# Patient Record
Sex: FEMALE | Race: BLACK OR AFRICAN AMERICAN | NOT HISPANIC OR LATINO | Employment: UNEMPLOYED | URBAN - METROPOLITAN AREA
[De-identification: names, ages, dates, MRNs, and addresses within clinical notes are randomized per-mention and may not be internally consistent; named-entity substitution may affect disease eponyms.]

---

## 2023-02-19 PROBLEM — R29.898 WEAKNESS OF LEFT LOWER EXTREMITY: Status: ACTIVE | Noted: 2023-02-19

## 2023-02-19 PROBLEM — M25.551 PAIN OF RIGHT HIP JOINT: Status: ACTIVE | Noted: 2023-02-19

## 2023-02-19 PROBLEM — M54.9 BACK PAIN: Status: ACTIVE | Noted: 2023-02-19

## 2023-02-19 PROBLEM — E04.9 THYROID ENLARGEMENT: Status: ACTIVE | Noted: 2023-02-19

## 2023-02-19 PROBLEM — I10 ESSENTIAL HYPERTENSION: Status: ACTIVE | Noted: 2023-02-19

## 2023-02-19 PROBLEM — R26.9 GAIT ABNORMALITY: Status: ACTIVE | Noted: 2023-02-19

## 2023-02-19 PROBLEM — E11.9 DIABETES MELLITUS (MULTI): Status: ACTIVE | Noted: 2023-02-19

## 2023-02-19 PROBLEM — R79.89 ABNORMAL LFTS: Status: ACTIVE | Noted: 2023-02-19

## 2023-02-19 PROBLEM — E87.6 HYPOKALEMIA: Status: ACTIVE | Noted: 2023-02-19

## 2023-02-19 PROBLEM — R06.09 DYSPNEA ON EXERTION: Status: ACTIVE | Noted: 2023-02-19

## 2023-02-19 PROBLEM — D17.1 LIPOMA OF TORSO: Status: ACTIVE | Noted: 2023-02-19

## 2023-02-19 PROBLEM — R00.0 TACHYCARDIA: Status: ACTIVE | Noted: 2023-02-19

## 2023-02-19 PROBLEM — E78.5 HYPERLIPIDEMIA: Status: ACTIVE | Noted: 2023-02-19

## 2023-02-19 PROBLEM — S82.262A: Status: ACTIVE | Noted: 2023-02-19

## 2023-02-19 PROBLEM — M19.90 ARTHRITIS: Status: ACTIVE | Noted: 2023-02-19

## 2023-02-19 PROBLEM — R79.89 LOW VITAMIN D LEVEL: Status: ACTIVE | Noted: 2023-02-19

## 2023-02-19 PROBLEM — R63.4 WEIGHT LOSS: Status: ACTIVE | Noted: 2023-02-19

## 2023-02-19 PROBLEM — K21.9 ACID REFLUX: Status: ACTIVE | Noted: 2023-02-19

## 2023-02-19 PROBLEM — D64.9 ANEMIA: Status: ACTIVE | Noted: 2023-02-19

## 2023-02-19 PROBLEM — F41.9 ANXIETY: Status: ACTIVE | Noted: 2023-02-19

## 2023-02-19 PROBLEM — G89.29 CHRONIC PAIN: Status: ACTIVE | Noted: 2023-02-19

## 2023-02-19 RX ORDER — METFORMIN HYDROCHLORIDE 1000 MG/1
1 TABLET ORAL
COMMUNITY
End: 2023-06-27 | Stop reason: SDUPTHER

## 2023-02-19 RX ORDER — ATORVASTATIN CALCIUM 40 MG/1
1 TABLET, FILM COATED ORAL NIGHTLY
COMMUNITY
End: 2023-05-25

## 2023-02-19 RX ORDER — PAROXETINE HYDROCHLORIDE 40 MG/1
20 TABLET, FILM COATED ORAL DAILY
COMMUNITY
End: 2023-05-25

## 2023-02-19 RX ORDER — CALCIUM CITRATE/VITAMIN D3 200MG-6.25
1 TABLET ORAL 3 TIMES DAILY
COMMUNITY
Start: 2020-06-02

## 2023-02-19 RX ORDER — DULOXETIN HYDROCHLORIDE 60 MG/1
60 CAPSULE, DELAYED RELEASE ORAL DAILY
COMMUNITY

## 2023-02-19 RX ORDER — OMEGA-3-ACID ETHYL ESTERS 1 G/1
2 CAPSULE, LIQUID FILLED ORAL 2 TIMES DAILY
COMMUNITY
Start: 2022-07-01 | End: 2023-12-13 | Stop reason: SDUPTHER

## 2023-02-19 RX ORDER — OXYCODONE HYDROCHLORIDE 10 MG/1
TABLET ORAL
COMMUNITY
Start: 2021-05-11

## 2023-02-19 RX ORDER — GLIPIZIDE 5 MG/1
1 TABLET ORAL
COMMUNITY
Start: 2022-07-21 | End: 2023-08-23 | Stop reason: SDUPTHER

## 2023-02-19 RX ORDER — OMEPRAZOLE 20 MG/1
1 CAPSULE, DELAYED RELEASE ORAL DAILY
COMMUNITY
Start: 2022-06-14 | End: 2023-05-25

## 2023-02-19 RX ORDER — BLOOD-GLUCOSE CONTROL, NORMAL
EACH MISCELLANEOUS
COMMUNITY

## 2023-02-19 RX ORDER — LIDOCAINE 3.5 G/1
1 PATCH TOPICAL DAILY
COMMUNITY
Start: 2022-04-27

## 2023-02-19 RX ORDER — BLOOD-GLUCOSE CONTROL, NORMAL
EACH MISCELLANEOUS 3 TIMES DAILY
COMMUNITY
End: 2024-05-17 | Stop reason: ALTCHOICE

## 2023-02-19 RX ORDER — BLOOD-GLUCOSE METER
EACH MISCELLANEOUS 3 TIMES DAILY
COMMUNITY

## 2023-02-19 RX ORDER — DIFLORASONE DIACETATE 0.5 MG/G
1 CREAM TOPICAL
COMMUNITY
Start: 2022-06-16 | End: 2023-12-13 | Stop reason: SDUPTHER

## 2023-02-19 RX ORDER — KETOCONAZOLE 20 MG/ML
1 SHAMPOO, SUSPENSION TOPICAL EVERY OTHER DAY
COMMUNITY
Start: 2022-04-29 | End: 2023-11-17 | Stop reason: SDUPTHER

## 2023-03-13 ENCOUNTER — OFFICE VISIT (OUTPATIENT)
Dept: PRIMARY CARE | Facility: CLINIC | Age: 63
End: 2023-03-13
Payer: COMMERCIAL

## 2023-03-13 VITALS
HEART RATE: 96 BPM | RESPIRATION RATE: 16 BRPM | HEIGHT: 69 IN | BODY MASS INDEX: 34.36 KG/M2 | SYSTOLIC BLOOD PRESSURE: 126 MMHG | OXYGEN SATURATION: 98 % | DIASTOLIC BLOOD PRESSURE: 82 MMHG | WEIGHT: 232 LBS

## 2023-03-13 DIAGNOSIS — R79.89 LOW VITAMIN D LEVEL: ICD-10-CM

## 2023-03-13 DIAGNOSIS — N39.498 OTHER URINARY INCONTINENCE: ICD-10-CM

## 2023-03-13 DIAGNOSIS — R23.8 DRY SCALP: ICD-10-CM

## 2023-03-13 DIAGNOSIS — Z12.31 SCREENING MAMMOGRAM, ENCOUNTER FOR: ICD-10-CM

## 2023-03-13 DIAGNOSIS — E11.9 TYPE 2 DIABETES MELLITUS WITHOUT COMPLICATION, WITHOUT LONG-TERM CURRENT USE OF INSULIN (MULTI): Primary | ICD-10-CM

## 2023-03-13 DIAGNOSIS — R31.29 OTHER MICROSCOPIC HEMATURIA: ICD-10-CM

## 2023-03-13 DIAGNOSIS — Z12.11 ENCOUNTER FOR SCREENING FOR MALIGNANT NEOPLASM OF COLON: ICD-10-CM

## 2023-03-13 LAB
POC APPEARANCE, URINE: CLEAR
POC BILIRUBIN, URINE: ABNORMAL
POC BLOOD, URINE: ABNORMAL
POC COLOR, URINE: ABNORMAL
POC GLUCOSE, URINE: NEGATIVE MG/DL
POC KETONES, URINE: NEGATIVE MG/DL
POC LEUKOCYTES, URINE: NEGATIVE
POC NITRITE,URINE: NEGATIVE
POC PH, URINE: 7 PH
POC PROTEIN, URINE: NEGATIVE MG/DL
POC SPECIFIC GRAVITY, URINE: 1.01
POC UROBILINOGEN, URINE: 0.2 EU/DL

## 2023-03-13 PROCEDURE — 3074F SYST BP LT 130 MM HG: CPT | Performed by: STUDENT IN AN ORGANIZED HEALTH CARE EDUCATION/TRAINING PROGRAM

## 2023-03-13 PROCEDURE — 3079F DIAST BP 80-89 MM HG: CPT | Performed by: STUDENT IN AN ORGANIZED HEALTH CARE EDUCATION/TRAINING PROGRAM

## 2023-03-13 PROCEDURE — 99214 OFFICE O/P EST MOD 30 MIN: CPT | Performed by: STUDENT IN AN ORGANIZED HEALTH CARE EDUCATION/TRAINING PROGRAM

## 2023-03-13 PROCEDURE — 1036F TOBACCO NON-USER: CPT | Performed by: STUDENT IN AN ORGANIZED HEALTH CARE EDUCATION/TRAINING PROGRAM

## 2023-03-13 PROCEDURE — 81002 URINALYSIS NONAUTO W/O SCOPE: CPT | Performed by: STUDENT IN AN ORGANIZED HEALTH CARE EDUCATION/TRAINING PROGRAM

## 2023-03-13 RX ORDER — CHLORTHALIDONE 25 MG/1
1 TABLET ORAL DAILY
COMMUNITY

## 2023-03-13 RX ORDER — CYCLOBENZAPRINE HCL 10 MG
1 TABLET ORAL
COMMUNITY
Start: 2022-04-27

## 2023-03-13 RX ORDER — CALCIUM CARB/VITAMIN D3/VIT K1 500-500-40
TABLET,CHEWABLE ORAL
COMMUNITY
End: 2023-03-13 | Stop reason: SDUPTHER

## 2023-03-13 RX ORDER — LANCETS 30 GAUGE
EACH MISCELLANEOUS
COMMUNITY
Start: 2023-02-12

## 2023-03-13 RX ORDER — FLUTICASONE PROPIONATE 0.5 MG/G
CREAM TOPICAL
COMMUNITY
Start: 2022-04-29

## 2023-03-13 RX ORDER — OXYBUTYNIN CHLORIDE 5 MG/1
5 TABLET ORAL 2 TIMES DAILY
Qty: 60 TABLET | Refills: 0 | Status: SHIPPED | OUTPATIENT
Start: 2023-03-13 | End: 2023-04-12

## 2023-03-13 RX ORDER — CALCIUM CARB/VITAMIN D3/VIT K1 500-500-40
1 TABLET,CHEWABLE ORAL DAILY
Qty: 90 CAPSULE | Refills: 3 | Status: SHIPPED | OUTPATIENT
Start: 2023-03-13 | End: 2023-11-17 | Stop reason: SDUPTHER

## 2023-03-13 ASSESSMENT — ENCOUNTER SYMPTOMS
APPETITE CHANGE: 0
CONFUSION: 0
COUGH: 0
ABDOMINAL DISTENTION: 0
FREQUENCY: 1
NERVOUS/ANXIOUS: 0
UNEXPECTED WEIGHT CHANGE: 0
FATIGUE: 1
FEVER: 0
ACTIVITY CHANGE: 0
PALPITATIONS: 0
WHEEZING: 0
ABDOMINAL PAIN: 0
HEMATURIA: 0
ARTHRALGIAS: 1
DYSPHORIC MOOD: 0
DYSURIA: 0

## 2023-03-13 ASSESSMENT — PATIENT HEALTH QUESTIONNAIRE - PHQ9
1. LITTLE INTEREST OR PLEASURE IN DOING THINGS: NOT AT ALL
2. FEELING DOWN, DEPRESSED OR HOPELESS: NOT AT ALL
SUM OF ALL RESPONSES TO PHQ9 QUESTIONS 1 AND 2: 0

## 2023-03-13 NOTE — PROGRESS NOTES
"HPI  61 yo female presents to [facility], former anujk patient and with several concerns    \"Bladder leaking\" throughout the day  No urgency  No change in color, no burning  No births or abdominal surgeries    Type of Diabetes II    Current diabetes rx: metformin 1000mg BID, glipizide 5mg TID  BG Range: , wide range, no lows   Cardiovascular:    Hx of CVA: No   Hx of MI: No   HTN: No   On An ACE: No    Nephropathy: No  Sept   Opth: Last Visit sept 2 2022  Retinopathy: No    Neuropathy:   Tingling/numbness: No   Pain: No    Review of Systems   Constitutional:  Positive for fatigue. Negative for activity change, appetite change, fever and unexpected weight change.   Respiratory:  Negative for cough and wheezing.    Cardiovascular:  Negative for chest pain, palpitations and leg swelling.   Gastrointestinal:  Negative for abdominal distention and abdominal pain.   Endocrine: Positive for polyuria.   Genitourinary:  Positive for frequency. Negative for dysuria, hematuria, pelvic pain, urgency, vaginal bleeding and vaginal discharge.        Incontinence   Musculoskeletal:  Positive for arthralgias.   Allergic/Immunologic: Negative for immunocompromised state.   Psychiatric/Behavioral:  Negative for confusion and dysphoric mood. The patient is not nervous/anxious.      Past Medical History:   Diagnosis Date    Other specified anxiety disorders     Depression with anxiety     Past Surgical History:   Procedure Laterality Date    CT ABDOMEN PELVIS ANGIOGRAM W AND/OR WO IV CONTRAST  2/17/2022    CT ABDOMEN PELVIS ANGIOGRAM W AND/OR WO IV CONTRAST 2/17/2022 [facility]    OTHER SURGICAL HISTORY  12/15/2020    [place] tooth extraction    OTHER SURGICAL HISTORY  02/16/2021    Tubal ligation    OTHER SURGICAL HISTORY  08/26/2022    Mass excision    OTHER SURGICAL HISTORY  07/26/2022    Lower leg fracture repair     Family History   Problem Relation Name Age of Onset    Other (CARDIAC DISORDER) Mother      " "Hypertension Mother      Heart attack Mother       Social History     Socioeconomic History    Marital status:      Spouse name: None    Number of children: None    Years of education: None    Highest education level: None   Occupational History    None   Tobacco Use    Smoking status: Former     Types: Cigarettes    Smokeless tobacco: Never   Vaping Use    Vaping status: Former   Substance and Sexual Activity    Alcohol use: None    Drug use: None    Sexual activity: None   Other Topics Concern    None   Social History Narrative    None     Social Determinants of Health     Financial Resource Strain: Not on file   Food Insecurity: Not on file   Transportation Needs: Not on file   Physical Activity: Not on file   Stress: Not on file   Social Connections: Not on file   Intimate Partner Violence: Not on file   Housing Stability: Not on file     Objective  /82 (BP Location: Right arm, Patient Position: Sitting, BP Cuff Size: Adult)   Pulse 96   Resp 16   Ht 1.753 m (5' 9\")   Wt 105 kg (232 lb)   SpO2 98%   BMI 34.26 kg/m²     Physical Exam  Constitutional:       Appearance: Normal appearance.   HENT:      Head: Normocephalic and atraumatic.   Eyes:      Extraocular Movements: Extraocular movements intact.   Cardiovascular:      Rate and Rhythm: Normal rate and regular rhythm.   Pulmonary:      Effort: Pulmonary effort is normal. No respiratory distress.   Skin:     Coloration: Skin is not jaundiced or pale.   Neurological:      General: No focal deficit present.      Mental Status: She is alert and oriented to person, place, and time.   Psychiatric:         Mood and Affect: Mood normal.         Behavior: Behavior normal.         Thought Content: Thought content normal.         Judgment: Judgment normal.       1. Type 2 diabetes mellitus without complication, without long-term current use of insulin (CMS/[facility])  Due for labs, ordered  -Encourage frequent SMBG  -Encourage regular follow up with " opthomology  -Encourage regular follow up with podiatry   -Hypoglycemia, insulin education   Diabetes Preventative Measures:   Lipid: due for lipid panel, on lipitor 40mg  BP: At goal  ACE/ARB: not presently on an ACE  - Hemoglobin A1c; Future  - Albumin , Urine Random; Future  - Lipid Panel; Future  - Comprehensive metabolic panel; Future    2. Low vitamin D level  - cholecalciferol, vitamin D3, 10 mcg (400 unit) capsule; Take 1 capsule (10 mcg) by mouth once daily.  Dispense: 90 capsule; Refill: 3    3. Other urinary incontinence  6. Other microscopic hematuria  - Referral to Urology; Future  - Urinalysis with Reflex Microscopic  UA + for blood, no infectious markers  Trial of oxybutynin while awaiting urology appt    4. Encounter for screening for malignant neoplasm of colon  - Cologuard® colon cancer screening; Future    5. Screening mammogram, encounter for  - BI mammo bilateral screening tomosynthesis; Future    6. Other microscopic hematuria  - Referral to Urology; Future    Discussed the above with the patient using shared decision making.   The patient is in agreement with the diagnostic and treatment plans.

## 2023-03-13 NOTE — ASSESSMENT & PLAN NOTE
Meds: metformin 1000mg BID, glipizide 5mg TID  Due for labs, ordered  -Encourage frequent SMBG  -Encourage regular follow up with opthomology  -Encourage regular follow up with podiatry   -Hypoglycemia, insulin education   Diabetes Preventative Measures:   Lipid: due for lipid panel, on lipitor 40mg  BP: At goal  ACE/ARB: cot presently on an ACE

## 2023-03-13 NOTE — LETTER
***HELP TEXT***  This letter is meant to display your personal SmartPhrase that you would like to send to patients.     1. Create a new SmartPhrase with the name MyPatientLetter.   2. Format the letter any way you want. Your organizational header will already be included so you can leave that out.  3. Save your SmartPhrase. If you get a warning about the name being used, ignore it.  4. Use the My Patient Letter template in the Communications section. This will send your letter!

## 2023-03-20 ENCOUNTER — TELEPHONE (OUTPATIENT)
Dept: PRIMARY CARE | Facility: CLINIC | Age: 63
End: 2023-03-20
Payer: COMMERCIAL

## 2023-03-20 NOTE — TELEPHONE ENCOUNTER
----- Message from Bren Caceres DO sent at 3/20/2023  7:06 AM EDT -----  Normal mammogram, can continue with annual screenings

## 2023-04-03 LAB — NONINV COLON CA DNA+OCC BLD SCRN STL QL: NEGATIVE

## 2023-05-03 RX ORDER — DIFLORASONE DIACETATE 0.5 MG/G
1 CREAM TOPICAL
Status: CANCELLED | OUTPATIENT
Start: 2023-05-03

## 2023-05-03 RX ORDER — LIDOCAINE 3.5 G/1
1 PATCH TOPICAL AS NEEDED
Qty: 1 PATCH | Refills: 2 | Status: CANCELLED | OUTPATIENT
Start: 2023-05-03

## 2023-05-25 DIAGNOSIS — E11.9 TYPE 2 DIABETES MELLITUS WITHOUT COMPLICATION, WITHOUT LONG-TERM CURRENT USE OF INSULIN (MULTI): Primary | ICD-10-CM

## 2023-05-25 DIAGNOSIS — E78.5 HYPERLIPIDEMIA, UNSPECIFIED HYPERLIPIDEMIA TYPE: ICD-10-CM

## 2023-05-25 RX ORDER — OMEPRAZOLE 20 MG/1
CAPSULE, DELAYED RELEASE ORAL
Qty: 90 CAPSULE | Refills: 0 | Status: SHIPPED | OUTPATIENT
Start: 2023-05-25 | End: 2023-08-02

## 2023-05-25 RX ORDER — PAROXETINE HYDROCHLORIDE 40 MG/1
TABLET, FILM COATED ORAL
Qty: 90 TABLET | Refills: 0 | Status: SHIPPED | OUTPATIENT
Start: 2023-05-25 | End: 2023-08-02

## 2023-05-25 RX ORDER — ATORVASTATIN CALCIUM 40 MG/1
TABLET, FILM COATED ORAL
Qty: 90 TABLET | Refills: 0 | Status: SHIPPED | OUTPATIENT
Start: 2023-05-25 | End: 2023-07-10

## 2023-05-25 RX ORDER — LANCETS 28 GAUGE
EACH MISCELLANEOUS
Qty: 100 EACH | Refills: 10 | Status: SHIPPED | OUTPATIENT
Start: 2023-05-25

## 2023-06-15 ENCOUNTER — OFFICE VISIT (OUTPATIENT)
Dept: PRIMARY CARE | Facility: CLINIC | Age: 63
End: 2023-06-15
Payer: COMMERCIAL

## 2023-06-15 VITALS
HEIGHT: 69 IN | BODY MASS INDEX: 32.97 KG/M2 | HEART RATE: 98 BPM | WEIGHT: 222.6 LBS | DIASTOLIC BLOOD PRESSURE: 72 MMHG | SYSTOLIC BLOOD PRESSURE: 122 MMHG | OXYGEN SATURATION: 98 % | RESPIRATION RATE: 16 BRPM

## 2023-06-15 DIAGNOSIS — F41.1 GAD (GENERALIZED ANXIETY DISORDER): ICD-10-CM

## 2023-06-15 DIAGNOSIS — E11.9 TYPE 2 DIABETES MELLITUS WITHOUT COMPLICATION, WITHOUT LONG-TERM CURRENT USE OF INSULIN (MULTI): ICD-10-CM

## 2023-06-15 DIAGNOSIS — N39.498 OTHER URINARY INCONTINENCE: ICD-10-CM

## 2023-06-15 DIAGNOSIS — Z11.59 ENCOUNTER FOR HEPATITIS C SCREENING TEST FOR LOW RISK PATIENT: Primary | ICD-10-CM

## 2023-06-15 DIAGNOSIS — M16.11 PRIMARY OSTEOARTHRITIS OF RIGHT HIP: ICD-10-CM

## 2023-06-15 DIAGNOSIS — L91.0 KELOID SCAR: ICD-10-CM

## 2023-06-15 DIAGNOSIS — M17.11 PRIMARY OSTEOARTHRITIS OF RIGHT KNEE: ICD-10-CM

## 2023-06-15 DIAGNOSIS — R31.29 OTHER MICROSCOPIC HEMATURIA: ICD-10-CM

## 2023-06-15 PROBLEM — M25.561 CHRONIC PAIN OF BOTH KNEES: Status: ACTIVE | Noted: 2021-01-07

## 2023-06-15 PROBLEM — E78.2 MIXED HYPERLIPIDEMIA: Status: ACTIVE | Noted: 2021-01-07

## 2023-06-15 PROBLEM — F32.A ANXIETY AND DEPRESSION: Status: ACTIVE | Noted: 2021-01-07

## 2023-06-15 PROBLEM — G89.29 CHRONIC PAIN OF BOTH KNEES: Status: ACTIVE | Noted: 2021-01-07

## 2023-06-15 PROBLEM — F41.9 ANXIETY AND DEPRESSION: Status: ACTIVE | Noted: 2021-01-07

## 2023-06-15 PROBLEM — M25.562 CHRONIC PAIN OF BOTH KNEES: Status: ACTIVE | Noted: 2021-01-07

## 2023-06-15 PROCEDURE — 99214 OFFICE O/P EST MOD 30 MIN: CPT | Performed by: STUDENT IN AN ORGANIZED HEALTH CARE EDUCATION/TRAINING PROGRAM

## 2023-06-15 PROCEDURE — 3074F SYST BP LT 130 MM HG: CPT | Performed by: STUDENT IN AN ORGANIZED HEALTH CARE EDUCATION/TRAINING PROGRAM

## 2023-06-15 PROCEDURE — 3078F DIAST BP <80 MM HG: CPT | Performed by: STUDENT IN AN ORGANIZED HEALTH CARE EDUCATION/TRAINING PROGRAM

## 2023-06-15 PROCEDURE — 1036F TOBACCO NON-USER: CPT | Performed by: STUDENT IN AN ORGANIZED HEALTH CARE EDUCATION/TRAINING PROGRAM

## 2023-06-15 RX ORDER — ASPIRIN 81 MG/1
81 TABLET ORAL
COMMUNITY
Start: 2020-04-17 | End: 2024-05-17 | Stop reason: ALTCHOICE

## 2023-06-15 RX ORDER — ACETAMINOPHEN 500 MG/1
1000 CAPSULE, LIQUID FILLED ORAL
COMMUNITY
Start: 2020-06-02 | End: 2023-06-29 | Stop reason: SDUPTHER

## 2023-06-15 RX ORDER — LANCING DEVICE
EACH MISCELLANEOUS
COMMUNITY
Start: 2023-04-21 | End: 2024-05-17 | Stop reason: ALTCHOICE

## 2023-06-15 RX ORDER — BLOOD-GLUCOSE METER
EACH MISCELLANEOUS
COMMUNITY
Start: 2023-04-21

## 2023-06-15 RX ORDER — TIZANIDINE 4 MG/1
4 TABLET ORAL 3 TIMES DAILY PRN
COMMUNITY
Start: 2020-06-02 | End: 2023-11-17 | Stop reason: WASHOUT

## 2023-06-15 RX ORDER — IBUPROFEN 800 MG/1
800 TABLET ORAL
COMMUNITY
Start: 2020-06-02 | End: 2023-11-17 | Stop reason: SDUPTHER

## 2023-06-15 RX ORDER — HYDROXYZINE HYDROCHLORIDE 50 MG/1
50 TABLET, FILM COATED ORAL EVERY 8 HOURS PRN
COMMUNITY
Start: 2020-04-17 | End: 2024-05-17

## 2023-06-15 ASSESSMENT — ANXIETY QUESTIONNAIRES
1. FEELING NERVOUS, ANXIOUS, OR ON EDGE: NOT AT ALL
7. FEELING AFRAID AS IF SOMETHING AWFUL MIGHT HAPPEN: NOT AT ALL
4. TROUBLE RELAXING: NOT AT ALL
5. BEING SO RESTLESS THAT IT IS HARD TO SIT STILL: NOT AT ALL
6. BECOMING EASILY ANNOYED OR IRRITABLE: NOT AT ALL
3. WORRYING TOO MUCH ABOUT DIFFERENT THINGS: NOT AT ALL
IF YOU CHECKED OFF ANY PROBLEMS ON THIS QUESTIONNAIRE, HOW DIFFICULT HAVE THESE PROBLEMS MADE IT FOR YOU TO DO YOUR WORK, TAKE CARE OF THINGS AT HOME, OR GET ALONG WITH OTHER PEOPLE: NOT DIFFICULT AT ALL
2. NOT BEING ABLE TO STOP OR CONTROL WORRYING: NOT AT ALL
GAD7 TOTAL SCORE: 0

## 2023-06-15 ASSESSMENT — ENCOUNTER SYMPTOMS
FREQUENCY: 0
LOSS OF SENSATION IN FEET: 0
ACTIVITY CHANGE: 0
SHORTNESS OF BREATH: 0
OCCASIONAL FEELINGS OF UNSTEADINESS: 0
PALPITATIONS: 0
DEPRESSION: 0
DYSURIA: 0
HEMATURIA: 0
COUGH: 0
ARTHRALGIAS: 1
CONFUSION: 0
FLANK PAIN: 0
FEVER: 0

## 2023-06-15 ASSESSMENT — PATIENT HEALTH QUESTIONNAIRE - PHQ9
2. FEELING DOWN, DEPRESSED OR HOPELESS: NOT AT ALL
1. LITTLE INTEREST OR PLEASURE IN DOING THINGS: NOT AT ALL
SUM OF ALL RESPONSES TO PHQ9 QUESTIONS 1 AND 2: 0

## 2023-06-15 NOTE — ASSESSMENT & PLAN NOTE
Xrays given chronicity  Labs to check kidney function(if NSAIDs appropriate) and diabetic markers(if injections)

## 2023-06-15 NOTE — PROGRESS NOTES
"Patient Name:  Naomie Muse  MRN:  [id number]  :  1960    Subjective   Patient ID: Naomie Muse is a 63 y.o. female who presents for Follow-up.    HPI     64 yo female presents for diabetes and new mediation for bladder leakage    UA in office positive for blood, referred to urology and UA with reflex ordered  Did not  medication for incontinence urgency symptoms     She did not complete labs     Right leg giving her problems- knee and hip feels like it might \"give out\"  Hx of OA  Has done pt in the past which did not help    Keloid scar would like looked at     Review of Systems   Constitutional:  Negative for activity change and fever.   Eyes:  Negative for visual disturbance.   Respiratory:  Negative for cough and shortness of breath.    Cardiovascular:  Negative for chest pain, palpitations and leg swelling.   Endocrine: Negative for polyuria.   Genitourinary:  Positive for urgency. Negative for dysuria, flank pain, frequency and hematuria.   Musculoskeletal:  Positive for arthralgias.   Skin:         L shoulder scar   Allergic/Immunologic: Negative for immunocompromised state.   Psychiatric/Behavioral:  Negative for confusion.        Objective   /72   Pulse 98   Resp 16   Ht 1.753 m (5' 9\")   Wt 101 kg (222 lb 9.6 oz)   SpO2 98%   BMI 32.87 kg/m²     Physical Exam  Constitutional:       Appearance: Normal appearance.   HENT:      Head: Normocephalic and atraumatic.   Eyes:      Extraocular Movements: Extraocular movements intact.   Cardiovascular:      Rate and Rhythm: Normal rate.   Musculoskeletal:      Right lower leg: No edema.      Left lower leg: No edema.   Skin:     Coloration: Skin is not jaundiced or pale.             Comments: 5cm linear keloid scar   Neurological:      General: No focal deficit present.      Mental Status: She is alert and oriented to person, place, and time.   Psychiatric:         Mood and Affect: Mood normal.         Behavior: Behavior normal.         " Thought Content: Thought content normal.         Judgment: Judgment normal.     Assessment/Plan   Problem List Items Addressed This Visit       NADIA (generalized anxiety disorder)     Needing note to keep her dog, mitchel with her in her apartment. Providers emotional support to he.  Continue medications as well.         Diabetes mellitus (CMS/HCC) (Chronic)     NEEDS LABS PLEASE COMPLETE  Current diabetes rx: metformin 1000mg BID, glipizide 5mg TID    Diabetes Preventative Measures:   Lipid: due for lipid panel see labs above, on lipitor 40mg  BP: At goal-->not presently on an ACE         Relevant Orders    Hemoglobin A1C    Lipid Panel    Albumin , Urine Random    Comprehensive Metabolic Panel    Follow Up In [facility] - PCP    Primary osteoarthritis of right knee (Chronic)     Xrays given chronicity  Labs to check kidney function(if NSAIDs appropriate) and diabetic markers(if injections)         Relevant Orders    XR knee right 1-2 views (Completed)    Primary osteoarthritis of right hip (Chronic)     Xrays given chronicity  Labs to check kidney function(if NSAIDs appropriate) and diabetic markers(if injections)           Relevant Orders    XR hip w pelvis (Completed)    Keloid scar     Silicone patches and massage  Await lab results can consider kenalog injection if DM controlled           Other Visit Diagnoses       Encounter for hepatitis C screening test for low risk patient    -  Primary    Relevant Orders    Hepatitis C antibody    Other urinary incontinence        Other microscopic hematuria        Relevant Orders    Urinalysis with Reflex Microscopic

## 2023-06-15 NOTE — ASSESSMENT & PLAN NOTE
NEEDS LABS PLEASE COMPLETE  Current diabetes rx: metformin 1000mg BID, glipizide 5mg TID    Diabetes Preventative Measures:   Lipid: due for lipid panel see labs above, on lipitor 40mg  BP: At goal-->not presently on an ACE

## 2023-06-16 ENCOUNTER — TELEPHONE (OUTPATIENT)
Dept: PRIMARY CARE | Facility: CLINIC | Age: 63
End: 2023-06-16

## 2023-06-16 ENCOUNTER — LAB (OUTPATIENT)
Dept: LAB | Facility: LAB | Age: 63
End: 2023-06-16
Payer: COMMERCIAL

## 2023-06-16 DIAGNOSIS — R80.9 PROTEINURIA, UNSPECIFIED TYPE: Primary | ICD-10-CM

## 2023-06-16 DIAGNOSIS — R31.29 OTHER MICROSCOPIC HEMATURIA: ICD-10-CM

## 2023-06-16 DIAGNOSIS — E11.9 TYPE 2 DIABETES MELLITUS WITHOUT COMPLICATION, WITHOUT LONG-TERM CURRENT USE OF INSULIN (MULTI): ICD-10-CM

## 2023-06-16 DIAGNOSIS — Z11.59 ENCOUNTER FOR HEPATITIS C SCREENING TEST FOR LOW RISK PATIENT: ICD-10-CM

## 2023-06-16 DIAGNOSIS — M16.11 PRIMARY OSTEOARTHRITIS OF RIGHT HIP: Primary | ICD-10-CM

## 2023-06-16 DIAGNOSIS — M17.11 PRIMARY OSTEOARTHRITIS OF RIGHT KNEE: ICD-10-CM

## 2023-06-16 PROBLEM — F41.1 GAD (GENERALIZED ANXIETY DISORDER): Status: ACTIVE | Noted: 2023-02-19

## 2023-06-16 LAB
ALANINE AMINOTRANSFERASE (SGPT) (U/L) IN SER/PLAS: 44 U/L (ref 7–45)
ALBUMIN (G/DL) IN SER/PLAS: 4.4 G/DL (ref 3.4–5)
ALBUMIN (MG/L) IN URINE: 38.3 MG/L
ALBUMIN/CREATININE (UG/MG) IN URINE: 8.8 UG/MG CRT (ref 0–30)
ALKALINE PHOSPHATASE (U/L) IN SER/PLAS: 72 U/L (ref 33–136)
AMORPHOUS CRYSTALS, URINE: ABNORMAL /HPF
ANION GAP IN SER/PLAS: 16 MMOL/L (ref 10–20)
APPEARANCE, URINE: ABNORMAL
ASPARTATE AMINOTRANSFERASE (SGOT) (U/L) IN SER/PLAS: 33 U/L (ref 9–39)
BILIRUBIN TOTAL (MG/DL) IN SER/PLAS: 0.6 MG/DL (ref 0–1.2)
BILIRUBIN, URINE: NEGATIVE
BLOOD, URINE: NEGATIVE
CALCIUM (MG/DL) IN SER/PLAS: 10.2 MG/DL (ref 8.6–10.3)
CARBON DIOXIDE, TOTAL (MMOL/L) IN SER/PLAS: 24 MMOL/L (ref 21–32)
CHLORIDE (MMOL/L) IN SER/PLAS: 109 MMOL/L (ref 98–107)
CHOLESTEROL (MG/DL) IN SER/PLAS: 131 MG/DL (ref 0–199)
CHOLESTEROL IN HDL (MG/DL) IN SER/PLAS: 61.7 MG/DL
CHOLESTEROL/HDL RATIO: 2.1
COLOR, URINE: ABNORMAL
CREATININE (MG/DL) IN SER/PLAS: 0.77 MG/DL (ref 0.5–1.05)
CREATININE (MG/DL) IN URINE: 437 MG/DL (ref 20–320)
ESTIMATED AVERAGE GLUCOSE FOR HBA1C: 140 MG/DL
GFR FEMALE: 86 ML/MIN/1.73M2
GLUCOSE (MG/DL) IN SER/PLAS: 184 MG/DL (ref 74–99)
GLUCOSE, URINE: NEGATIVE MG/DL
HEMOGLOBIN A1C/HEMOGLOBIN TOTAL IN BLOOD: 6.5 %
HEPATITIS C VIRUS AB PRESENCE IN SERUM: NONREACTIVE
HYALINE CASTS, URINE: ABNORMAL /LPF
KETONES, URINE: NEGATIVE MG/DL
LDL: 52 MG/DL (ref 0–99)
LEUKOCYTE ESTERASE, URINE: NEGATIVE
MUCUS, URINE: ABNORMAL /LPF
NITRITE, URINE: NEGATIVE
PH, URINE: 5 (ref 5–8)
POTASSIUM (MMOL/L) IN SER/PLAS: 4.3 MMOL/L (ref 3.5–5.3)
PROTEIN TOTAL: 7.2 G/DL (ref 6.4–8.2)
PROTEIN, URINE: ABNORMAL MG/DL
RBC, URINE: 1 /HPF (ref 0–5)
SODIUM (MMOL/L) IN SER/PLAS: 145 MMOL/L (ref 136–145)
SPECIFIC GRAVITY, URINE: 1.03 (ref 1–1.03)
SQUAMOUS EPITHELIAL CELLS, URINE: 11 /HPF
TRIGLYCERIDE (MG/DL) IN SER/PLAS: 85 MG/DL (ref 0–149)
UREA NITROGEN (MG/DL) IN SER/PLAS: 14 MG/DL (ref 6–23)
UROBILINOGEN, URINE: <2 MG/DL (ref 0–1.9)
VLDL: 17 MG/DL (ref 0–40)
WBC, URINE: 2 /HPF (ref 0–5)

## 2023-06-16 PROCEDURE — 82570 ASSAY OF URINE CREATININE: CPT

## 2023-06-16 PROCEDURE — 36415 COLL VENOUS BLD VENIPUNCTURE: CPT

## 2023-06-16 PROCEDURE — 82043 UR ALBUMIN QUANTITATIVE: CPT

## 2023-06-16 PROCEDURE — 80061 LIPID PANEL: CPT

## 2023-06-16 PROCEDURE — 83036 HEMOGLOBIN GLYCOSYLATED A1C: CPT

## 2023-06-16 PROCEDURE — 80053 COMPREHEN METABOLIC PANEL: CPT

## 2023-06-16 PROCEDURE — 81001 URINALYSIS AUTO W/SCOPE: CPT

## 2023-06-16 PROCEDURE — 86803 HEPATITIS C AB TEST: CPT

## 2023-06-16 NOTE — TELEPHONE ENCOUNTER
----- Message from Bren Caceres DO sent at 6/16/2023  9:05 AM EDT -----  R hip stable from last imaging: moderate joint space narrowing with spurring  Knee also showing spurring  Depending on labs we can consider knee injections, hip injections would need to be through ortho

## 2023-06-19 ENCOUNTER — TELEPHONE (OUTPATIENT)
Dept: PRIMARY CARE | Facility: CLINIC | Age: 63
End: 2023-06-19
Payer: COMMERCIAL

## 2023-06-19 NOTE — TELEPHONE ENCOUNTER
----- Message from Bren Caceres DO sent at 6/19/2023 10:48 AM EDT -----  A1c is under much better control, I am very pleased! IN regards to her urine, she is showing some protein and casts in it which can be signs of her kidneys being stressed, although her function on labs was WNL. Will continue to monitor and repeat labs before next appt.

## 2023-06-26 ENCOUNTER — TELEPHONE (OUTPATIENT)
Dept: PRIMARY CARE | Facility: CLINIC | Age: 63
End: 2023-06-26
Payer: COMMERCIAL

## 2023-06-26 NOTE — TELEPHONE ENCOUNTER
She went to [facility]  ER on 6/22 for lip laceration.    Needs stitches out Thursday 6/29    Schedule on your schedule ?  Or can it be on Nurse schedule ?

## 2023-06-26 NOTE — TELEPHONE ENCOUNTER
Unfortunately neither ESTRELLITA or myself have any openings. Dr Manuel does can we please check with him if he can see this patient, thanks.

## 2023-06-27 DIAGNOSIS — E11.9 TYPE 2 DIABETES MELLITUS WITHOUT COMPLICATION, WITHOUT LONG-TERM CURRENT USE OF INSULIN (MULTI): Primary | ICD-10-CM

## 2023-06-28 RX ORDER — METFORMIN HYDROCHLORIDE 1000 MG/1
1000 TABLET ORAL
Qty: 180 TABLET | Refills: 1 | Status: SHIPPED | OUTPATIENT
Start: 2023-06-28 | End: 2024-01-02

## 2023-06-29 ENCOUNTER — OFFICE VISIT (OUTPATIENT)
Dept: PRIMARY CARE | Facility: CLINIC | Age: 63
End: 2023-06-29
Payer: COMMERCIAL

## 2023-06-29 VITALS
BODY MASS INDEX: 32.88 KG/M2 | RESPIRATION RATE: 18 BRPM | WEIGHT: 222 LBS | OXYGEN SATURATION: 100 % | HEIGHT: 69 IN | SYSTOLIC BLOOD PRESSURE: 110 MMHG | DIASTOLIC BLOOD PRESSURE: 80 MMHG | HEART RATE: 94 BPM

## 2023-06-29 DIAGNOSIS — S01.511D: Primary | ICD-10-CM

## 2023-06-29 DIAGNOSIS — Z48.02 VISIT FOR SUTURE REMOVAL: ICD-10-CM

## 2023-06-29 PROCEDURE — 3074F SYST BP LT 130 MM HG: CPT | Performed by: STUDENT IN AN ORGANIZED HEALTH CARE EDUCATION/TRAINING PROGRAM

## 2023-06-29 PROCEDURE — 99214 OFFICE O/P EST MOD 30 MIN: CPT | Performed by: STUDENT IN AN ORGANIZED HEALTH CARE EDUCATION/TRAINING PROGRAM

## 2023-06-29 PROCEDURE — 1036F TOBACCO NON-USER: CPT | Performed by: STUDENT IN AN ORGANIZED HEALTH CARE EDUCATION/TRAINING PROGRAM

## 2023-06-29 PROCEDURE — 3079F DIAST BP 80-89 MM HG: CPT | Performed by: STUDENT IN AN ORGANIZED HEALTH CARE EDUCATION/TRAINING PROGRAM

## 2023-06-29 PROCEDURE — 3044F HG A1C LEVEL LT 7.0%: CPT | Performed by: STUDENT IN AN ORGANIZED HEALTH CARE EDUCATION/TRAINING PROGRAM

## 2023-06-29 RX ORDER — MUPIROCIN 20 MG/G
OINTMENT TOPICAL 3 TIMES DAILY
Qty: 22 G | Refills: 0 | Status: SHIPPED | OUTPATIENT
Start: 2023-06-29 | End: 2023-07-09

## 2023-06-29 RX ORDER — ACETAMINOPHEN 500 MG/1
1000 CAPSULE, LIQUID FILLED ORAL DAILY
Qty: 30 CAPSULE | Refills: 0 | Status: SHIPPED | OUTPATIENT
Start: 2023-06-29 | End: 2023-07-14

## 2023-06-29 RX ORDER — CYANOCOBALAMIN (VITAMIN B-12) 500 MCG
10 TABLET ORAL DAILY
COMMUNITY
Start: 2021-04-29 | End: 2023-11-17 | Stop reason: WASHOUT

## 2023-06-29 ASSESSMENT — ENCOUNTER SYMPTOMS
ABDOMINAL PAIN: 0
COUGH: 0
SINUS PAIN: 0
ACTIVITY CHANGE: 0
SLEEP DISTURBANCE: 0
WEAKNESS: 0
WHEEZING: 0
DIZZINESS: 0
CONFUSION: 0
NERVOUS/ANXIOUS: 0
SHORTNESS OF BREATH: 0
WOUND: 1
HEMATURIA: 0
TROUBLE SWALLOWING: 0
FATIGUE: 0
ABDOMINAL DISTENTION: 0
EYE DISCHARGE: 0
LOSS OF SENSATION IN FEET: 0
OCCASIONAL FEELINGS OF UNSTEADINESS: 0
HEADACHES: 0
DEPRESSION: 0
BLOOD IN STOOL: 0
FEVER: 0
NAUSEA: 0
POLYPHAGIA: 0
POLYDIPSIA: 0
CONSTIPATION: 0
CHILLS: 0

## 2023-06-29 NOTE — PROGRESS NOTES
Subjective   Patient ID: Naomie Muse is a 63 y.o. female who presents for Follow-up (Patient comes in to have sutures removed. /Patient fell 6/22/23 and hit her face.).  HPI  63-year-old female patient of Dr. Caceres, medical history of type 2 diabetes, depression, and anxiety presents to clinic for suture removal.  Patient tripped and had a fall landing with her face down.  Patient stated that instead of landing on top of her granddaughter she avoided a granddaughter and landed on her face /mouth resulting in a laceration of her upper lips.  Patient presented to [facility] and had 5 stitches placed on her wound.  She was advised to present to her PCP for suture removal in a week's time.    Today patient states she is doing well.  She has no pain, notes intermittent irritation from the sutures.  Her wound has healed but there are multiple scabs.  Visible suture knots are present    Review of Systems   Constitutional:  Negative for activity change, chills, fatigue and fever.   HENT:  Negative for congestion, ear discharge, sinus pain and trouble swallowing.    Eyes:  Negative for discharge and visual disturbance.   Respiratory:  Negative for cough, shortness of breath and wheezing.    Cardiovascular:  Negative for chest pain and leg swelling.   Gastrointestinal:  Negative for abdominal distention, abdominal pain, blood in stool, constipation and nausea.   Endocrine: Negative for polydipsia and polyphagia.   Genitourinary:  Negative for hematuria.   Musculoskeletal:  Negative for gait problem.   Skin:  Positive for wound.   Allergic/Immunologic: Negative for food allergies.   Neurological:  Negative for dizziness, weakness and headaches.   Psychiatric/Behavioral:  Negative for confusion, sleep disturbance and suicidal ideas. The patient is not nervous/anxious.        Objective   Physical Exam  Vitals and nursing note reviewed.   Constitutional:       Appearance: Normal appearance. She is normal weight.   HENT:       Head: Normocephalic and atraumatic.        Right Ear: Tympanic membrane normal.      Left Ear: Tympanic membrane normal.      Mouth/Throat:      Mouth: Mucous membranes are dry.   Eyes:      Extraocular Movements: Extraocular movements intact.      Conjunctiva/sclera: Conjunctivae normal.   Cardiovascular:      Rate and Rhythm: Normal rate.      Pulses: Normal pulses.   Pulmonary:      Effort: Pulmonary effort is normal. No respiratory distress.      Breath sounds: Normal breath sounds.   Abdominal:      General: Bowel sounds are normal. There is no distension.      Palpations: Abdomen is soft. There is no mass.   Musculoskeletal:         General: Normal range of motion.      Cervical back: Normal range of motion and neck supple.   Skin:     General: Skin is warm and dry.      Findings: Bruising and lesion present.   Neurological:      General: No focal deficit present.      Mental Status: She is alert. Mental status is at baseline.   Psychiatric:         Mood and Affect: Mood normal.          Procedure notes: suture removal   Diagnosis: upper mouth laceration     Patient's wound located between her nose and lips was soften and moisten with sterile normal saline. Sterile forceps and scissors were used to cut and pull sutures out from the patient's wound. 5 suture knots were cut and  removed. No bleeding noted. Patient tolerated the procedure well.     Patient's wound was covered with mixed antibiotics and bandage placed over it. Patient was advised to remove bandage and keep the clean.  A total of  5 knots were removed.     Assessment/Plan   - Patient was prescribed mupirocin ointment. Tylenol for pain   - wound care referral placed should her wound not heal      Problem List Items Addressed This Visit    None  Visit Diagnoses       Laceration of vermilion border of upper lip, subsequent encounter    -  Primary    Relevant Medications    mupirocin (Bactroban) 2 % ointment    acetaminophen 500 mg capsule    Other  Relevant Orders    Follow Up In [facility] - PCP - Established    Follow Up In [facility] - PCP - Established    Referral to Wound Clinic    Visit for suture removal        Relevant Medications    acetaminophen 500 mg capsule    Other Relevant Orders    Follow Up In [facility] - PCP - Established    Referral to Wound Clinic

## 2023-06-29 NOTE — PATIENT INSTRUCTIONS
5 sutures removed    Will prescribe mupirocin topical antibiotics and as needed tylenol for pain    Keep the area clean. Should heal on its own. You can apply Vaseline     Follow up as scheduled in 2 weeks or sooner if needed

## 2023-07-09 DIAGNOSIS — E11.9 TYPE 2 DIABETES MELLITUS WITHOUT COMPLICATION, WITHOUT LONG-TERM CURRENT USE OF INSULIN (MULTI): ICD-10-CM

## 2023-07-10 DIAGNOSIS — E11.9 TYPE 2 DIABETES MELLITUS WITHOUT COMPLICATION, WITHOUT LONG-TERM CURRENT USE OF INSULIN (MULTI): ICD-10-CM

## 2023-07-10 RX ORDER — ATORVASTATIN CALCIUM 40 MG/1
TABLET, FILM COATED ORAL
Qty: 90 TABLET | Refills: 1 | Status: SHIPPED | OUTPATIENT
Start: 2023-07-10 | End: 2023-07-11

## 2023-07-11 RX ORDER — ATORVASTATIN CALCIUM 40 MG/1
TABLET, FILM COATED ORAL
Qty: 90 TABLET | Refills: 3 | Status: SHIPPED | OUTPATIENT
Start: 2023-07-11 | End: 2024-01-31

## 2023-07-17 ENCOUNTER — OFFICE VISIT (OUTPATIENT)
Dept: PRIMARY CARE | Facility: CLINIC | Age: 63
End: 2023-07-17
Payer: COMMERCIAL

## 2023-07-17 VITALS
SYSTOLIC BLOOD PRESSURE: 134 MMHG | DIASTOLIC BLOOD PRESSURE: 78 MMHG | OXYGEN SATURATION: 99 % | BODY MASS INDEX: 33.03 KG/M2 | RESPIRATION RATE: 16 BRPM | HEART RATE: 95 BPM | HEIGHT: 69 IN | WEIGHT: 223 LBS

## 2023-07-17 DIAGNOSIS — L91.0 KELOID SCAR: ICD-10-CM

## 2023-07-17 DIAGNOSIS — E11.9 TYPE 2 DIABETES MELLITUS WITHOUT COMPLICATION, WITHOUT LONG-TERM CURRENT USE OF INSULIN (MULTI): ICD-10-CM

## 2023-07-17 DIAGNOSIS — F41.1 GAD (GENERALIZED ANXIETY DISORDER): Primary | ICD-10-CM

## 2023-07-17 PROCEDURE — 99214 OFFICE O/P EST MOD 30 MIN: CPT | Performed by: STUDENT IN AN ORGANIZED HEALTH CARE EDUCATION/TRAINING PROGRAM

## 2023-07-17 PROCEDURE — 1036F TOBACCO NON-USER: CPT | Performed by: STUDENT IN AN ORGANIZED HEALTH CARE EDUCATION/TRAINING PROGRAM

## 2023-07-17 PROCEDURE — 3075F SYST BP GE 130 - 139MM HG: CPT | Performed by: STUDENT IN AN ORGANIZED HEALTH CARE EDUCATION/TRAINING PROGRAM

## 2023-07-17 PROCEDURE — 3078F DIAST BP <80 MM HG: CPT | Performed by: STUDENT IN AN ORGANIZED HEALTH CARE EDUCATION/TRAINING PROGRAM

## 2023-07-17 PROCEDURE — 3044F HG A1C LEVEL LT 7.0%: CPT | Performed by: STUDENT IN AN ORGANIZED HEALTH CARE EDUCATION/TRAINING PROGRAM

## 2023-07-17 ASSESSMENT — ENCOUNTER SYMPTOMS
LOSS OF SENSATION IN FEET: 0
SPEECH DIFFICULTY: 0
SHORTNESS OF BREATH: 0
LIGHT-HEADEDNESS: 0
DEPRESSION: 0
ACTIVITY CHANGE: 0
OCCASIONAL FEELINGS OF UNSTEADINESS: 0
ROS SKIN COMMENTS: SCARS

## 2023-07-17 ASSESSMENT — PATIENT HEALTH QUESTIONNAIRE - PHQ9: 1. LITTLE INTEREST OR PLEASURE IN DOING THINGS: NOT AT ALL

## 2023-07-17 NOTE — ASSESSMENT & PLAN NOTE
A1c 6.5 doing very well  Continue metformin 1000mg BID, glipizide 5mg TID  On lipitor not on ACE at this time

## 2023-07-17 NOTE — PROGRESS NOTES
"Patient Name:  Naomie Muse  MRN:  [id number]  :  1960    Subjective   Patient ID: Naomie Muse is a 63 y.o. female who presents for Follow-up.    HPI     64 yo female presents for follow up     Housing paperwork completed    No concerns would like to review recent labs     Review of Systems   Constitutional:  Negative for activity change.   Respiratory:  Negative for shortness of breath.    Cardiovascular:  Negative for chest pain.   Genitourinary:  Negative for decreased urine volume.   Skin:         scars   Allergic/Immunologic: Negative for immunocompromised state.   Neurological:  Negative for speech difficulty and light-headedness.     Objective   /78   Pulse 95   Resp 16   Ht 1.753 m (5' 9\")   Wt 101 kg (223 lb)   SpO2 99%   BMI 32.93 kg/m²     Physical Exam  Constitutional:       Appearance: Normal appearance.   HENT:      Head: Normocephalic and atraumatic.   Cardiovascular:      Rate and Rhythm: Normal rate and regular rhythm.   Pulmonary:      Effort: Pulmonary effort is normal.   Skin:     Comments: Improvement keloid on back of L shoulder  Well healing [place] scar border   Neurological:      General: No focal deficit present.      Mental Status: She is alert and oriented to person, place, and time.   Psychiatric:         Mood and Affect: Mood normal.         Behavior: Behavior normal.     Assessment/Plan   Problem List Items Addressed This Visit       NADIA (generalized anxiety disorder) - Primary (Chronic)     Paperwork for housing and for her emotional support animal completed         Diabetes mellitus (CMS/[facility]) (Chronic)     A1c 6.5 doing very well  Continue metformin 1000mg BID, glipizide 5mg TID  On lipitor not on ACE at this time         Relevant Orders    Follow Up In [facility] - PCP - Established    Basic metabolic panel    Hemoglobin A1c    Keloid scar     Some improvement with silicone patches              "

## 2023-07-26 ENCOUNTER — TELEPHONE (OUTPATIENT)
Dept: PRIMARY CARE | Facility: CLINIC | Age: 63
End: 2023-07-26
Payer: COMMERCIAL

## 2023-08-01 DIAGNOSIS — E11.9 TYPE 2 DIABETES MELLITUS WITHOUT COMPLICATION, WITHOUT LONG-TERM CURRENT USE OF INSULIN (MULTI): ICD-10-CM

## 2023-08-02 RX ORDER — OMEPRAZOLE 20 MG/1
20 CAPSULE, DELAYED RELEASE ORAL DAILY
Qty: 90 CAPSULE | Refills: 1 | Status: SHIPPED | OUTPATIENT
Start: 2023-08-02 | End: 2024-01-31

## 2023-08-02 RX ORDER — PAROXETINE HYDROCHLORIDE 40 MG/1
40 TABLET, FILM COATED ORAL DAILY
Qty: 90 TABLET | Refills: 0 | Status: SHIPPED | OUTPATIENT
Start: 2023-08-02 | End: 2023-11-01

## 2023-08-23 ENCOUNTER — TELEPHONE (OUTPATIENT)
Dept: PRIMARY CARE | Facility: CLINIC | Age: 63
End: 2023-08-23
Payer: COMMERCIAL

## 2023-08-23 DIAGNOSIS — E11.9 TYPE 2 DIABETES MELLITUS WITHOUT COMPLICATION, WITHOUT LONG-TERM CURRENT USE OF INSULIN (MULTI): Primary | ICD-10-CM

## 2023-08-23 RX ORDER — GLIPIZIDE 5 MG/1
5 TABLET ORAL
Qty: 90 TABLET | Refills: 0 | Status: SHIPPED | OUTPATIENT
Start: 2023-08-23 | End: 2023-09-01

## 2023-08-23 NOTE — TELEPHONE ENCOUNTER
Rx Refill Request Telephone Encounter    Name:  Naomie Muse  :  [id number]  Medication Name:  Glipizide  5mg          Specific Pharmacy location:  [facility]

## 2023-08-30 DIAGNOSIS — E11.9 TYPE 2 DIABETES MELLITUS WITHOUT COMPLICATION, WITHOUT LONG-TERM CURRENT USE OF INSULIN (MULTI): ICD-10-CM

## 2023-09-01 RX ORDER — GLIPIZIDE 5 MG/1
5 TABLET ORAL
Qty: 90 TABLET | Refills: 0 | Status: SHIPPED | OUTPATIENT
Start: 2023-09-01 | End: 2023-10-05

## 2023-09-15 ENCOUNTER — APPOINTMENT (OUTPATIENT)
Dept: PRIMARY CARE | Facility: CLINIC | Age: 63
End: 2023-09-15
Payer: COMMERCIAL

## 2023-09-29 DIAGNOSIS — E11.9 TYPE 2 DIABETES MELLITUS WITHOUT COMPLICATION, WITHOUT LONG-TERM CURRENT USE OF INSULIN (MULTI): ICD-10-CM

## 2023-10-05 RX ORDER — GLIPIZIDE 5 MG/1
5 TABLET ORAL
Qty: 90 TABLET | Refills: 3 | Status: SHIPPED | OUTPATIENT
Start: 2023-10-05 | End: 2024-01-31

## 2023-10-16 ENCOUNTER — TELEPHONE (OUTPATIENT)
Dept: PRIMARY CARE | Facility: CLINIC | Age: 63
End: 2023-10-16
Payer: COMMERCIAL

## 2023-10-16 NOTE — TELEPHONE ENCOUNTER
Patient is asking for a letter for an emotional support animal, where the patient lives is requiring a letter for her dog.

## 2023-10-16 NOTE — TELEPHONE ENCOUNTER
I filled one out for her last month, was there an issue with that one? I just want to make sure I dont need to change anything I gave her my standard letter last time?

## 2023-10-16 NOTE — LETTER
October 18, 2023     Naomie TIWARI Micha  [address]  [facility] [place] [address]    Patient: Naomie Muse   YOB: 1960   Date of Visit: 10/16/2023       Dear Naomie Muse,     As per our discussion and review of your history, I have diagnosed you with an emotional disability recognized in the Diagnostic and Statistical Manual of Mental Disorders-Fifth Edition (DSM-5). Specifically, NADIA.    I am also familiar with the limitations imposed by your diagnosed disability and the need to mitigate those limitations and associated symptoms. As such, during our most recent consultation and evaluation I approved Alicia, the Evelia, as an emotional support animal for you.     In my professional opinion, it is necessary that this animal to live with you because its presence will mitigate the symptoms of your disability by allowing you to fully enjoy your home and perform activities of daily living within your housing unit    I have written this letter to comply with the Fair Housing Act, the Americans With Disabilities Act, and other laws providing people with disabilities an equal opportunity to use and enjoy a dwelling, so your landlord should not need you to disclose any additional information. Please understand that you may voluntarily disclose your personal medical/mental health information to whomever you choose; however, I advise you to be very judicious about the people to whom you may provide this letter and additional information.    I highly recommend that you take the steps necessary to establish your rights and continue to use an emotional support animal as we  explore and address the root causes of your diagnosis. If you feel that your emotional support animal no longer provides the necessary  benefits or your symptoms increase, please contact me so that we may determine if this is the best path for you.    If you do take the necessary steps to establish your rights but your landlord refuses to provide  you with a reasonable accommodation,  you have the right to make complaint with  Department of [employer] and Urban Development ([facility]) by filing a claim of discrimination  through the HUD website - www.hud.gov - or calling the discrimination hotline at 1-904.708.5731. They will investigate the claim free of  charge.    Please understand that you are responsible for your DAVID’s behavior and for any damage that it may cause- please make sure that it is  well-behaved. In addition, please note that your diagnosis and treatment is ongoing and will need to be reassessed on a routine basis.    Sincerely,  Dr. Bren Caceres  [facility]    Sincerely,     Bren Caceres, DO

## 2023-10-17 NOTE — ASSESSMENT & PLAN NOTE
Left message on answering machine for patient to call back.     Needing note to keep her dogmitchel with her in her apartment. Providers emotional support to he.  Continue medications as well.

## 2023-10-17 NOTE — TELEPHONE ENCOUNTER
She called cause she has got another dog and they will need a new letter with name and breed. The breed is a yorky name is alex. Please call her when it is ready to  at [phone].

## 2023-10-30 DIAGNOSIS — E11.9 TYPE 2 DIABETES MELLITUS WITHOUT COMPLICATION, WITHOUT LONG-TERM CURRENT USE OF INSULIN (MULTI): ICD-10-CM

## 2023-11-01 RX ORDER — PAROXETINE HYDROCHLORIDE 40 MG/1
40 TABLET, FILM COATED ORAL DAILY
Qty: 90 TABLET | Refills: 1 | Status: SHIPPED | OUTPATIENT
Start: 2023-11-01 | End: 2024-04-29

## 2023-11-17 ENCOUNTER — OFFICE VISIT (OUTPATIENT)
Dept: PRIMARY CARE | Facility: CLINIC | Age: 63
End: 2023-11-17
Payer: COMMERCIAL

## 2023-11-17 VITALS
DIASTOLIC BLOOD PRESSURE: 88 MMHG | BODY MASS INDEX: 32.91 KG/M2 | OXYGEN SATURATION: 98 % | HEART RATE: 84 BPM | RESPIRATION RATE: 16 BRPM | WEIGHT: 222.2 LBS | HEIGHT: 69 IN | SYSTOLIC BLOOD PRESSURE: 138 MMHG

## 2023-11-17 DIAGNOSIS — E78.5 HYPERLIPIDEMIA, UNSPECIFIED HYPERLIPIDEMIA TYPE: ICD-10-CM

## 2023-11-17 DIAGNOSIS — R79.89 LOW VITAMIN D LEVEL: ICD-10-CM

## 2023-11-17 DIAGNOSIS — R23.8 DRY SCALP: ICD-10-CM

## 2023-11-17 DIAGNOSIS — Z00.00 MEDICARE ANNUAL WELLNESS VISIT, INITIAL: Primary | ICD-10-CM

## 2023-11-17 DIAGNOSIS — Z23 FLU VACCINE NEED: ICD-10-CM

## 2023-11-17 DIAGNOSIS — E11.9 TYPE 2 DIABETES MELLITUS WITHOUT COMPLICATION, WITHOUT LONG-TERM CURRENT USE OF INSULIN (MULTI): ICD-10-CM

## 2023-11-17 DIAGNOSIS — F41.1 GAD (GENERALIZED ANXIETY DISORDER): Chronic | ICD-10-CM

## 2023-11-17 DIAGNOSIS — R31.29 OTHER MICROSCOPIC HEMATURIA: ICD-10-CM

## 2023-11-17 DIAGNOSIS — G89.29 OTHER CHRONIC PAIN: ICD-10-CM

## 2023-11-17 PROCEDURE — 3075F SYST BP GE 130 - 139MM HG: CPT | Performed by: STUDENT IN AN ORGANIZED HEALTH CARE EDUCATION/TRAINING PROGRAM

## 2023-11-17 PROCEDURE — 99214 OFFICE O/P EST MOD 30 MIN: CPT | Performed by: STUDENT IN AN ORGANIZED HEALTH CARE EDUCATION/TRAINING PROGRAM

## 2023-11-17 PROCEDURE — 99396 PREV VISIT EST AGE 40-64: CPT | Performed by: STUDENT IN AN ORGANIZED HEALTH CARE EDUCATION/TRAINING PROGRAM

## 2023-11-17 PROCEDURE — 3044F HG A1C LEVEL LT 7.0%: CPT | Performed by: STUDENT IN AN ORGANIZED HEALTH CARE EDUCATION/TRAINING PROGRAM

## 2023-11-17 PROCEDURE — 90686 IIV4 VACC NO PRSV 0.5 ML IM: CPT | Performed by: STUDENT IN AN ORGANIZED HEALTH CARE EDUCATION/TRAINING PROGRAM

## 2023-11-17 PROCEDURE — G0008 ADMIN INFLUENZA VIRUS VAC: HCPCS | Performed by: STUDENT IN AN ORGANIZED HEALTH CARE EDUCATION/TRAINING PROGRAM

## 2023-11-17 PROCEDURE — G0402 INITIAL PREVENTIVE EXAM: HCPCS | Performed by: STUDENT IN AN ORGANIZED HEALTH CARE EDUCATION/TRAINING PROGRAM

## 2023-11-17 PROCEDURE — 3079F DIAST BP 80-89 MM HG: CPT | Performed by: STUDENT IN AN ORGANIZED HEALTH CARE EDUCATION/TRAINING PROGRAM

## 2023-11-17 PROCEDURE — G0403 EKG FOR INITIAL PREVENT EXAM: HCPCS | Performed by: STUDENT IN AN ORGANIZED HEALTH CARE EDUCATION/TRAINING PROGRAM

## 2023-11-17 PROCEDURE — 1036F TOBACCO NON-USER: CPT | Performed by: STUDENT IN AN ORGANIZED HEALTH CARE EDUCATION/TRAINING PROGRAM

## 2023-11-17 RX ORDER — CALCIUM CARB/VITAMIN D3/VIT K1 500-500-40
2000 TABLET,CHEWABLE ORAL DAILY
Qty: 90 CAPSULE | Refills: 3 | Status: SHIPPED | OUTPATIENT
Start: 2023-11-17 | End: 2023-11-20

## 2023-11-17 RX ORDER — IBUPROFEN 800 MG/1
800 TABLET ORAL 4 TIMES DAILY
Qty: 90 TABLET | Refills: 1 | Status: CANCELLED | OUTPATIENT
Start: 2023-11-17

## 2023-11-17 RX ORDER — KETOCONAZOLE 20 MG/ML
1 SHAMPOO, SUSPENSION TOPICAL EVERY OTHER DAY
Qty: 90 ML | Refills: 1 | Status: SHIPPED | OUTPATIENT
Start: 2023-11-17 | End: 2024-05-17 | Stop reason: SDUPTHER

## 2023-11-17 RX ORDER — IBUPROFEN 800 MG/1
800 TABLET ORAL 2 TIMES DAILY PRN
Qty: 60 TABLET | Refills: 1 | Status: SHIPPED | OUTPATIENT
Start: 2023-11-17 | End: 2024-01-29

## 2023-11-17 ASSESSMENT — PATIENT HEALTH QUESTIONNAIRE - PHQ9
2. FEELING DOWN, DEPRESSED OR HOPELESS: SEVERAL DAYS
10. IF YOU CHECKED OFF ANY PROBLEMS, HOW DIFFICULT HAVE THESE PROBLEMS MADE IT FOR YOU TO DO YOUR WORK, TAKE CARE OF THINGS AT HOME, OR GET ALONG WITH OTHER PEOPLE: SOMEWHAT DIFFICULT
1. LITTLE INTEREST OR PLEASURE IN DOING THINGS: SEVERAL DAYS
SUM OF ALL RESPONSES TO PHQ9 QUESTIONS 1 AND 2: 2

## 2023-11-17 ASSESSMENT — ENCOUNTER SYMPTOMS
ACTIVITY CHANGE: 0
BRUISES/BLEEDS EASILY: 0
LIGHT-HEADEDNESS: 0
FACIAL ASYMMETRY: 0
LOSS OF SENSATION IN FEET: 0
NERVOUS/ANXIOUS: 0
OCCASIONAL FEELINGS OF UNSTEADINESS: 0
HEADACHES: 0
SHORTNESS OF BREATH: 0
CONFUSION: 0
ARTHRALGIAS: 1
APPETITE CHANGE: 0
DEPRESSION: 0
FEVER: 0
DYSPHORIC MOOD: 0

## 2023-11-17 ASSESSMENT — ACTIVITIES OF DAILY LIVING (ADL)
MANAGING_FINANCES: INDEPENDENT
DOING_HOUSEWORK: INDEPENDENT
BATHING: INDEPENDENT
GROCERY_SHOPPING: INDEPENDENT
TAKING_MEDICATION: INDEPENDENT
DRESSING: INDEPENDENT

## 2023-11-17 ASSESSMENT — VISUAL ACUITY
OS_CC: 20/20
OD_CC: 20/20

## 2023-11-17 NOTE — ASSESSMENT & PLAN NOTE
Foot exam performed in office today- does have decreased vibratory sensation on the L side, otherwise normal  Declines further therapy at this time

## 2023-11-17 NOTE — PROGRESS NOTES
"Subjective   Reason for Visit: Naomie Muse is an 63 y.o. female here for a Medicare Wellness visit.     Past Medical, Surgical, and Family History reviewed and updated in chart.    Reviewed all medications by prescribing practitioner or clinical pharmacist (such as prescriptions, OTCs, herbal therapies and supplements) and documented in the medical record.    HPI    64 yo female presents for initial medicare wellness     Patient Care Team:  Bren Caceres DO as PCP - General (Family Medicine)  Bren Caceres DO as PCP - [employer] PCP  Son Romero MD as PCP - [facility] PCP  Robson Edwards MD as Surgeon (Urology)     Review of Systems   Constitutional:  Negative for activity change, appetite change and fever.   Respiratory:  Negative for shortness of breath.    Cardiovascular:  Negative for chest pain.   Musculoskeletal:  Positive for arthralgias.   Skin:         Keloids easily    Allergic/Immunologic: Negative for immunocompromised state.   Neurological:  Negative for facial asymmetry, light-headedness and headaches.   Hematological:  Does not bruise/bleed easily.   Psychiatric/Behavioral:  Negative for confusion and dysphoric mood. The patient is not nervous/anxious.      Objective   Vitals:  /88   Pulse 84   Resp 16   Ht 1.753 m (5' 9\")   Wt 101 kg (222 lb 3.2 oz)   SpO2 98%   BMI 32.81 kg/m²       Physical Exam  Constitutional:       Appearance: Normal appearance.   HENT:      Head: Normocephalic and atraumatic.   Cardiovascular:      Rate and Rhythm: Normal rate and regular rhythm.      Pulses:           Dorsalis pedis pulses are 2+ on the right side and 2+ on the left side.        Posterior tibial pulses are 2+ on the right side and 2+ on the left side.   Pulmonary:      Effort: No respiratory distress.   Feet:      Right foot:      Protective Sensation: 5 sites tested.  5 sites sensed.      Skin integrity: Skin integrity normal.      Toenail Condition: Right toenails " are normal.      Left foot:      Protective Sensation: 5 sites tested.  5 sites sensed.      Skin integrity: Skin integrity normal.      Toenail Condition: Left toenails are normal.      Comments: Decreased vibratory sensation on the L foot  Neurological:      General: No focal deficit present.      Mental Status: She is alert and oriented to person, place, and time.   Psychiatric:         Mood and Affect: Mood normal.         Behavior: Behavior normal.     Assessment/Plan   Problem List Items Addressed This Visit       Low vitamin D level    Relevant Medications    cholecalciferol (Vitamin D-3) 400 unit capsule    NADIA (generalized anxiety disorder) (Chronic)    Current Assessment & Plan     Continue paxil, working well for managing symptoms  DAVID paperwork up to date          Chronic pain (Chronic)    Current Assessment & Plan     Counseled on ibuprofen potentially increasing BP, and never to be taken on an empty stomach   Will continue only PRN usage   Counseled on max daily usage parameters          Relevant Medications    ibuprofen 800 mg tablet    Diabetes mellitus (CMS/HCC) (Chronic)    Overview     Meds: metformin 1000mg BID, glipizide 5mg TID  Due for labs, ordered  -Encourage frequent SMBG  -Encourage regular follow up with opthomology  -Encourage regular follow up with podiatry   -Hypoglycemia, insulin education   Diabetes Preventative Measures:   Lipid: due for lipid panel, on lipitor 40mg  BP: At goal  ACE/ARB: not presently on an ACE         Current Assessment & Plan     Foot exam performed in office today- does have decreased vibratory sensation on the L side, otherwise normal  Declines further therapy at this time          Relevant Orders    Follow Up In [facility] - PCP - Established    Medicare annual wellness visit, initial - Primary    Current Assessment & Plan     PNEUMONIA vaccine- Prevnar 20 due in April 2024  SHINGLES vaccine- Declined  INFLUENZA vaccine- Ordered  Screening  tests:  Colon cancer screening--> Due 3/2026 for cologuard   Breast Cancer screening--> Due 3/2024  Cervical Cancer Screening--> Not due   DXA screening--> Due 2024  During the course of the visit the patient was educated and counseled about age appropriate screening and preventive services. Completed preventive screenings were documented in the chart and orders were placed for outstanding screenings/procedures as documented in the Assessment and Plan.  Patient Instructions (the written plan) was given to the patient at check out.           Relevant Orders    ECG 12 lead (Clinic Performed) (Completed)     Other Visit Diagnoses       Flu vaccine need        Relevant Orders    Flu vaccine (IIV4) age 6 months and greater, preservative free    Dry scalp        Relevant Medications    ketoconazole (NIZOral) 2 % shampoo

## 2023-11-17 NOTE — ASSESSMENT & PLAN NOTE
PNEUMONIA vaccine- Prevnar 20 due in April 2024  SHINGLES vaccine- Declined  INFLUENZA vaccine- Ordered  Screening tests:  Colon cancer screening--> Due 3/2026 for cologuard   Breast Cancer screening--> Due 3/2024  Cervical Cancer Screening--> Not due   DXA screening--> Due 2024  During the course of the visit the patient was educated and counseled about age appropriate screening and preventive services. Completed preventive screenings were documented in the chart and orders were placed for outstanding screenings/procedures as documented in the Assessment and Plan.  Patient Instructions (the written plan) was given to the patient at check out.

## 2023-11-17 NOTE — ASSESSMENT & PLAN NOTE
"Subjective:      Patient ID: Purvi Rangel is a 55 y.o. female.    Chief Complaint: Sore Throat and Sinusitis    Patient is new to me, being seen today for sore throat and sinus symptoms x1mth.     Home COVID negative     Originally seen 6/15 virtually by PCP, Rx. Doxy  Symptoms improved slightly but restarted upon completion of antibiotics     Last visit June 2023 with Liza Grant NP.  Persistent symptoms at that time.  Rx: xyzal, flonase, promethazine DM, medrol dose pack   Also taking allegra, previously tried Tylenol  Still with nasal congestion and sore throat    H/o seasonal allergies but denies recurrent sinus infections     Review of Systems   Constitutional:  Positive for fever (when symptoms initially started ~1mth ago, none since then). Negative for chills and diaphoresis.   HENT:  Positive for congestion, ear pain and sore throat. Negative for rhinorrhea, sinus pressure (improved) and sinus pain.    Respiratory:  Positive for cough (productive at times), shortness of breath and wheezing.         Denies h/o asthma, COPD, PNA  Non-smoker   Gastrointestinal:  Negative for abdominal pain, constipation, diarrhea, nausea and vomiting.   Skin:  Negative for rash.   Neurological:  Negative for dizziness, light-headedness and headaches (improved).     Objective:   /88   Pulse 100   Temp 97.7 °F (36.5 °C)   Resp 20   Ht 5' 3" (1.6 m)   Wt 64.8 kg (142 lb 13.7 oz)   LMP 12/28/2015 Comment: last periods 5 years ago  SpO2 95%   BMI 25.31 kg/m²   Physical Exam  Constitutional:       General: She is not in acute distress.     Appearance: She is well-developed. She is not ill-appearing or diaphoretic.   HENT:      Head: Normocephalic and atraumatic.      Right Ear: Tympanic membrane and ear canal normal. No middle ear effusion. Tympanic membrane is not erythematous.      Left Ear: Tympanic membrane and ear canal normal.  No middle ear effusion. Tympanic membrane is not erythematous.      Nose: Mucosal " Meds: metformin 1000mg BID, glipizide 5mg TID  Due for labs, ordered  -Encourage frequent SMBG  -Encourage regular follow up with opthomology  -Encourage regular follow up with podiatry   -Hypoglycemia, insulin education   Diabetes Preventative Measures:   Lipid: due for lipid panel, on lipitor 40mg  BP: At goal  ACE/ARB: not presently on an ACE   edema present.      Right Sinus: Frontal sinus tenderness present. No maxillary sinus tenderness.      Left Sinus: Frontal sinus tenderness present. No maxillary sinus tenderness.      Mouth/Throat:      Pharynx: Uvula midline. No posterior oropharyngeal erythema.      Comments: Signs of PND  Cardiovascular:      Rate and Rhythm: Normal rate and regular rhythm.      Heart sounds: Normal heart sounds. No murmur heard.  Pulmonary:      Effort: Pulmonary effort is normal. No respiratory distress.      Breath sounds: Normal breath sounds. No decreased breath sounds, wheezing, rhonchi or rales.   Lymphadenopathy:      Cervical: No cervical adenopathy.   Skin:     General: Skin is warm and dry.      Findings: No rash.   Psychiatric:         Speech: Speech normal.         Behavior: Behavior normal.         Thought Content: Thought content normal.     Assessment:      1. Sore throat    2. Seasonal allergic rhinitis, unspecified trigger    3. Cough, unspecified type    4. Sinus pressure    5. Shortness of breath       Plan:   Sore throat  -     Group A Strep, Molecular  -     (Magic mouthwash) 1:1:1 diphenhydrAMINE(Benadryl) 12.5mg/5ml liq, aluminum & magnesium hydroxide-simethicone (Maalox), LIDOcaine viscous 2%; Swish and spit 15 mLs every 4 (four) hours as needed (throat pain).  Dispense: 240 mL; Refill: 0    Seasonal allergic rhinitis, unspecified trigger  -     montelukast (SINGULAIR) 10 mg tablet; Take 1 tablet (10 mg total) by mouth every evening.  Dispense: 30 tablet; Refill: 0    Cough, unspecified type  -     X-Ray Chest PA And Lateral; Future; Expected date: 07/07/2023  -     albuterol (PROVENTIL/VENTOLIN HFA) 90 mcg/actuation inhaler; Inhale 2 puffs into the lungs every 6 (six) hours as needed for Wheezing.  Dispense: 8 g; Refill: 0    Sinus pressure  -     X-Ray Sinuses 3 or more views; Future; Expected date: 07/07/2023  -     montelukast (SINGULAIR) 10 mg tablet; Take 1 tablet (10 mg total) by mouth every  evening.  Dispense: 30 tablet; Refill: 0    Shortness of breath  -     albuterol (PROVENTIL/VENTOLIN HFA) 90 mcg/actuation inhaler; Inhale 2 puffs into the lungs every 6 (six) hours as needed for Wheezing.  Dispense: 8 g; Refill: 0      Continue xyzal, flonase, add on singulair  Inhaler prn   F/u ENT if persistent     Discussed worsening signs/symptoms and when to return to clinic or go to ED.   Patient expresses understanding and agrees with treatment plan.

## 2023-11-20 DIAGNOSIS — R79.89 LOW VITAMIN D LEVEL: ICD-10-CM

## 2023-11-20 RX ORDER — CHOLECALCIFEROL (VITAMIN D3) 10 MCG
TABLET ORAL
Qty: 90 TABLET | Refills: 3 | Status: SHIPPED | OUTPATIENT
Start: 2023-11-20 | End: 2023-11-21

## 2023-11-21 RX ORDER — CHOLECALCIFEROL (VITAMIN D3) 25 MCG
2000 TABLET ORAL DAILY
Qty: 60 TABLET | Refills: 11 | Status: SHIPPED | OUTPATIENT
Start: 2023-11-21 | End: 2024-05-17 | Stop reason: WASHOUT

## 2023-12-13 DIAGNOSIS — E78.5 HYPERLIPIDEMIA, UNSPECIFIED HYPERLIPIDEMIA TYPE: Primary | ICD-10-CM

## 2023-12-13 DIAGNOSIS — R23.8 DRY SCALP: ICD-10-CM

## 2023-12-13 RX ORDER — DIFLORASONE DIACETATE 0.5 MG/G
1 CREAM TOPICAL 2 TIMES DAILY
Qty: 60 G | Refills: 3 | Status: SHIPPED | OUTPATIENT
Start: 2023-12-13

## 2023-12-13 RX ORDER — OMEGA-3-ACID ETHYL ESTERS 1 G/1
2 CAPSULE, LIQUID FILLED ORAL 2 TIMES DAILY
Qty: 180 CAPSULE | Refills: 3 | Status: SHIPPED | OUTPATIENT
Start: 2023-12-13 | End: 2024-04-03 | Stop reason: SDUPTHER

## 2023-12-29 DIAGNOSIS — E11.9 TYPE 2 DIABETES MELLITUS WITHOUT COMPLICATION, WITHOUT LONG-TERM CURRENT USE OF INSULIN (MULTI): ICD-10-CM

## 2024-01-02 RX ORDER — METFORMIN HYDROCHLORIDE 1000 MG/1
1000 TABLET ORAL
Qty: 60 TABLET | Refills: 10 | Status: SHIPPED | OUTPATIENT
Start: 2024-01-02

## 2024-01-27 DIAGNOSIS — G89.29 OTHER CHRONIC PAIN: ICD-10-CM

## 2024-01-29 RX ORDER — IBUPROFEN 800 MG/1
800 TABLET ORAL 2 TIMES DAILY PRN
Qty: 60 TABLET | Refills: 1 | Status: SHIPPED | OUTPATIENT
Start: 2024-01-29 | End: 2024-03-21

## 2024-01-30 DIAGNOSIS — E11.9 TYPE 2 DIABETES MELLITUS WITHOUT COMPLICATION, WITHOUT LONG-TERM CURRENT USE OF INSULIN (MULTI): ICD-10-CM

## 2024-01-31 RX ORDER — GLIPIZIDE 5 MG/1
5 TABLET ORAL
Qty: 90 TABLET | Refills: 3 | Status: SHIPPED | OUTPATIENT
Start: 2024-01-31 | End: 2024-05-17 | Stop reason: DRUGHIGH

## 2024-01-31 RX ORDER — ATORVASTATIN CALCIUM 40 MG/1
40 TABLET, FILM COATED ORAL DAILY
Qty: 90 TABLET | Refills: 3 | Status: SHIPPED | OUTPATIENT
Start: 2024-01-31

## 2024-01-31 RX ORDER — OMEPRAZOLE 20 MG/1
20 CAPSULE, DELAYED RELEASE ORAL DAILY
Qty: 90 CAPSULE | Refills: 3 | Status: SHIPPED | OUTPATIENT
Start: 2024-01-31

## 2024-03-11 ENCOUNTER — TELEPHONE (OUTPATIENT)
Dept: PRIMARY CARE | Facility: CLINIC | Age: 64
End: 2024-03-11
Payer: COMMERCIAL

## 2024-03-11 DIAGNOSIS — Z12.31 ENCOUNTER FOR SCREENING MAMMOGRAM FOR BREAST CANCER: Primary | ICD-10-CM

## 2024-03-20 ENCOUNTER — HOSPITAL ENCOUNTER (OUTPATIENT)
Dept: RADIOLOGY | Facility: HOSPITAL | Age: 64
Discharge: HOME | End: 2024-03-20
Payer: COMMERCIAL

## 2024-03-20 DIAGNOSIS — Z12.31 ENCOUNTER FOR SCREENING MAMMOGRAM FOR BREAST CANCER: ICD-10-CM

## 2024-03-20 PROCEDURE — 77067 SCR MAMMO BI INCL CAD: CPT

## 2024-03-20 PROCEDURE — 77063 BREAST TOMOSYNTHESIS BI: CPT | Performed by: RADIOLOGY

## 2024-03-20 PROCEDURE — 77067 SCR MAMMO BI INCL CAD: CPT | Performed by: RADIOLOGY

## 2024-03-21 DIAGNOSIS — G89.29 OTHER CHRONIC PAIN: ICD-10-CM

## 2024-03-21 RX ORDER — IBUPROFEN 800 MG/1
800 TABLET ORAL 2 TIMES DAILY PRN
Qty: 60 TABLET | Refills: 1 | Status: SHIPPED | OUTPATIENT
Start: 2024-03-21 | End: 2024-06-06

## 2024-03-22 ENCOUNTER — TELEPHONE (OUTPATIENT)
Dept: PRIMARY CARE | Facility: CLINIC | Age: 64
End: 2024-03-22
Payer: COMMERCIAL

## 2024-03-22 NOTE — TELEPHONE ENCOUNTER
----- Message from Bren Caceres DO sent at 3/22/2024  9:47 AM EDT -----  Review of mammogram, normal study no areas of concern will continue with annual screenings.

## 2024-04-03 ENCOUNTER — TELEPHONE (OUTPATIENT)
Dept: PRIMARY CARE | Facility: CLINIC | Age: 64
End: 2024-04-03
Payer: COMMERCIAL

## 2024-04-03 DIAGNOSIS — E78.5 HYPERLIPIDEMIA, UNSPECIFIED HYPERLIPIDEMIA TYPE: ICD-10-CM

## 2024-04-03 RX ORDER — OMEGA-3-ACID ETHYL ESTERS 1 G/1
2 CAPSULE, LIQUID FILLED ORAL 2 TIMES DAILY
Qty: 180 CAPSULE | Refills: 3 | Status: SHIPPED | OUTPATIENT
Start: 2024-04-03 | End: 2024-04-03 | Stop reason: SDUPTHER

## 2024-04-03 NOTE — TELEPHONE ENCOUNTER
Med Refill   omega-3 acid ethyl esters (Lovaza) 1 gram capsule [[id number]]     [id number] - [place], [place] - [address] [place] [address] AT CORNER OF [address] [address]  Phone: [phone]  Fax: [phone]

## 2024-04-04 RX ORDER — OMEGA-3-ACID ETHYL ESTERS 1 G/1
2 CAPSULE, LIQUID FILLED ORAL 2 TIMES DAILY
Qty: 180 CAPSULE | Refills: 3 | Status: SHIPPED | OUTPATIENT
Start: 2024-04-04

## 2024-04-26 DIAGNOSIS — E11.9 TYPE 2 DIABETES MELLITUS WITHOUT COMPLICATION, WITHOUT LONG-TERM CURRENT USE OF INSULIN (MULTI): ICD-10-CM

## 2024-04-29 RX ORDER — PAROXETINE HYDROCHLORIDE 40 MG/1
40 TABLET, FILM COATED ORAL DAILY
Qty: 90 TABLET | Refills: 3 | Status: SHIPPED | OUTPATIENT
Start: 2024-04-29

## 2024-05-17 ENCOUNTER — OFFICE VISIT (OUTPATIENT)
Dept: PRIMARY CARE | Facility: CLINIC | Age: 64
End: 2024-05-17
Payer: COMMERCIAL

## 2024-05-17 VITALS
DIASTOLIC BLOOD PRESSURE: 86 MMHG | WEIGHT: 221 LBS | HEIGHT: 69 IN | OXYGEN SATURATION: 97 % | BODY MASS INDEX: 32.73 KG/M2 | SYSTOLIC BLOOD PRESSURE: 128 MMHG | HEART RATE: 95 BPM

## 2024-05-17 DIAGNOSIS — N39.43 POST-VOID DRIBBLING: ICD-10-CM

## 2024-05-17 DIAGNOSIS — E11.41 TYPE 2 DIABETES MELLITUS WITH DIABETIC MONONEUROPATHY, WITHOUT LONG-TERM CURRENT USE OF INSULIN (MULTI): ICD-10-CM

## 2024-05-17 DIAGNOSIS — R23.8 DRY SCALP: ICD-10-CM

## 2024-05-17 DIAGNOSIS — R06.02 SOB (SHORTNESS OF BREATH): Primary | ICD-10-CM

## 2024-05-17 DIAGNOSIS — E11.9 TYPE 2 DIABETES MELLITUS WITHOUT COMPLICATION, WITHOUT LONG-TERM CURRENT USE OF INSULIN (MULTI): ICD-10-CM

## 2024-05-17 PROCEDURE — 99214 OFFICE O/P EST MOD 30 MIN: CPT | Performed by: STUDENT IN AN ORGANIZED HEALTH CARE EDUCATION/TRAINING PROGRAM

## 2024-05-17 PROCEDURE — 1036F TOBACCO NON-USER: CPT | Performed by: STUDENT IN AN ORGANIZED HEALTH CARE EDUCATION/TRAINING PROGRAM

## 2024-05-17 PROCEDURE — 1160F RVW MEDS BY RX/DR IN RCRD: CPT | Performed by: STUDENT IN AN ORGANIZED HEALTH CARE EDUCATION/TRAINING PROGRAM

## 2024-05-17 PROCEDURE — 3079F DIAST BP 80-89 MM HG: CPT | Performed by: STUDENT IN AN ORGANIZED HEALTH CARE EDUCATION/TRAINING PROGRAM

## 2024-05-17 PROCEDURE — 3074F SYST BP LT 130 MM HG: CPT | Performed by: STUDENT IN AN ORGANIZED HEALTH CARE EDUCATION/TRAINING PROGRAM

## 2024-05-17 PROCEDURE — 1159F MED LIST DOCD IN RCRD: CPT | Performed by: STUDENT IN AN ORGANIZED HEALTH CARE EDUCATION/TRAINING PROGRAM

## 2024-05-17 RX ORDER — GLIPIZIDE 5 MG/1
5 TABLET ORAL
Qty: 90 TABLET | Refills: 3 | Status: SHIPPED | OUTPATIENT
Start: 2024-05-17 | End: 2024-05-30

## 2024-05-17 RX ORDER — KETOCONAZOLE 20 MG/ML
1 SHAMPOO, SUSPENSION TOPICAL EVERY OTHER DAY
Qty: 90 ML | Refills: 1 | Status: SHIPPED | OUTPATIENT
Start: 2024-05-17

## 2024-05-17 RX ORDER — HYDROXYZINE HYDROCHLORIDE 10 MG/1
10 TABLET, FILM COATED ORAL 3 TIMES DAILY PRN
Qty: 30 TABLET | Refills: 0 | Status: SHIPPED | OUTPATIENT
Start: 2024-05-17 | End: 2024-06-16

## 2024-05-17 ASSESSMENT — ENCOUNTER SYMPTOMS
WHEEZING: 0
HEADACHES: 0
LOSS OF SENSATION IN FEET: 0
APPETITE CHANGE: 0
COUGH: 0
FACIAL ASYMMETRY: 0
CHILLS: 0
OCCASIONAL FEELINGS OF UNSTEADINESS: 0
ARTHRALGIAS: 1
PALPITATIONS: 0
SHORTNESS OF BREATH: 1
ACTIVITY CHANGE: 0
CONFUSION: 0
DEPRESSION: 1

## 2024-05-17 ASSESSMENT — PATIENT HEALTH QUESTIONNAIRE - PHQ9
1. LITTLE INTEREST OR PLEASURE IN DOING THINGS: SEVERAL DAYS
SUM OF ALL RESPONSES TO PHQ9 QUESTIONS 1 AND 2: 2
2. FEELING DOWN, DEPRESSED OR HOPELESS: SEVERAL DAYS
10. IF YOU CHECKED OFF ANY PROBLEMS, HOW DIFFICULT HAVE THESE PROBLEMS MADE IT FOR YOU TO DO YOUR WORK, TAKE CARE OF THINGS AT HOME, OR GET ALONG WITH OTHER PEOPLE: NOT DIFFICULT AT ALL

## 2024-05-17 ASSESSMENT — ANXIETY QUESTIONNAIRES
7. FEELING AFRAID AS IF SOMETHING AWFUL MIGHT HAPPEN: NOT AT ALL
4. TROUBLE RELAXING: SEVERAL DAYS
2. NOT BEING ABLE TO STOP OR CONTROL WORRYING: NOT AT ALL
GAD7 TOTAL SCORE: 4
6. BECOMING EASILY ANNOYED OR IRRITABLE: SEVERAL DAYS
IF YOU CHECKED OFF ANY PROBLEMS ON THIS QUESTIONNAIRE, HOW DIFFICULT HAVE THESE PROBLEMS MADE IT FOR YOU TO DO YOUR WORK, TAKE CARE OF THINGS AT HOME, OR GET ALONG WITH OTHER PEOPLE: NOT DIFFICULT AT ALL
1. FEELING NERVOUS, ANXIOUS, OR ON EDGE: SEVERAL DAYS
5. BEING SO RESTLESS THAT IT IS HARD TO SIT STILL: SEVERAL DAYS
3. WORRYING TOO MUCH ABOUT DIFFERENT THINGS: NOT AT ALL

## 2024-05-17 ASSESSMENT — COLUMBIA-SUICIDE SEVERITY RATING SCALE - C-SSRS
2. HAVE YOU ACTUALLY HAD ANY THOUGHTS OF KILLING YOURSELF?: NO
6. HAVE YOU EVER DONE ANYTHING, STARTED TO DO ANYTHING, OR PREPARED TO DO ANYTHING TO END YOUR LIFE?: NO
1. IN THE PAST MONTH, HAVE YOU WISHED YOU WERE DEAD OR WISHED YOU COULD GO TO SLEEP AND NOT WAKE UP?: NO

## 2024-05-17 NOTE — ASSESSMENT & PLAN NOTE
Metformin 1000mg BID, Januvia 5mg BID    Diabetes Preventative Measures:   Lipid: due for lipid panel, on lipitor 40mg  BP: At goal  ACE/ARB: not presently on an ACE    Last foot exam 11/2023- does have decreased vibratory sensation on the L side, otherwise normal

## 2024-05-17 NOTE — PROGRESS NOTES
"Patient Name:  Naomie Muse  MRN:  [id number]  :  1960    Subjective   Patient ID: Naomie Muse is a 64 y.o. female who presents for Follow-up (Pt here for follow up, still getting SOB, incontinence).    HPI     63 yo female presents for follow up    Says an issue she had back with Dr Romero was SOB   Completed stress test and echo - inadequate level of stress , EF 65%  Happens when carrying a baby or going upstairs    Incontinence issues- 5 months ago started  Leaking  Not worse with     Dry scalp keeping her up at night  Shampoo helping but also taking benadryl     Review of Systems   Constitutional:  Negative for activity change, appetite change and chills.   Respiratory:  Positive for shortness of breath. Negative for cough and wheezing.         With exertion    Cardiovascular:  Negative for chest pain, palpitations and leg swelling.   Genitourinary:         Incontinence    Musculoskeletal:  Positive for arthralgias.   Allergic/Immunologic: Negative for immunocompromised state.   Neurological:  Negative for facial asymmetry and headaches.   Psychiatric/Behavioral:  Negative for behavioral problems and confusion.      Objective   /86 (BP Location: Left arm, Patient Position: Sitting)   Pulse 95   Ht 1.753 m (5' 9\")   Wt 100 kg (221 lb)   SpO2 97%   BMI 32.64 kg/m²     Physical Exam  Constitutional:       Appearance: Normal appearance.   Cardiovascular:      Rate and Rhythm: Normal rate and regular rhythm.   Pulmonary:      Effort: Pulmonary effort is normal. No respiratory distress.   Skin:     Comments: Dry flaking scalp    Neurological:      General: No focal deficit present.      Mental Status: She is alert and oriented to person, place, and time.   Psychiatric:         Mood and Affect: Mood normal.         Behavior: Behavior normal.     Assessment/Plan   Problem List Items Addressed This Visit             ICD-10-CM    Type 2 diabetes mellitus with diabetic mononeuropathy, without " long-term current use of insulin (Multi) E11.41     Metformin 1000mg BID, Januvia 5mg BID    Diabetes Preventative Measures:   Lipid: due for lipid panel, on lipitor 40mg  BP: At goal  ACE/ARB: not presently on an ACE    Last foot exam 11/2023- does have decreased vibratory sensation on the L side, otherwise normal          Relevant Medications    glipiZIDE (Glucotrol) 5 mg tablet    SOB (shortness of breath) - Primary R06.02     2022 echo WNL  2022 stress- not optimal for eval  PFTS ordered  Deconditioning also possible diagnosis         Relevant Orders    Complete Pulmonary Function Test Pre/Post Bronchodialator (Spirometry Pre/Post/DLCO/Lung Volumes)    Dry scalp R23.8    Relevant Medications    ketoconazole (NIZOral) 2 % shampoo    hydrOXYzine HCL (Atarax) 10 mg tablet    Post-void dribbling N39.43     Pelvic floor PT  If symptoms persist consider urogyn eval          Relevant Orders    Referral to Physical Therapy

## 2024-05-17 NOTE — ASSESSMENT & PLAN NOTE
2022 echo WNL  2022 stress- not optimal for eval  PFTS ordered  Deconditioning also possible diagnosis

## 2024-05-28 DIAGNOSIS — E11.9 TYPE 2 DIABETES MELLITUS WITHOUT COMPLICATION, WITHOUT LONG-TERM CURRENT USE OF INSULIN (MULTI): ICD-10-CM

## 2024-05-30 RX ORDER — GLIPIZIDE 10 MG/1
10 TABLET, FILM COATED, EXTENDED RELEASE ORAL DAILY
Qty: 90 TABLET | Refills: 1 | Status: SHIPPED | OUTPATIENT
Start: 2024-05-30 | End: 2024-11-26

## 2024-06-05 ENCOUNTER — HOSPITAL ENCOUNTER (OUTPATIENT)
Dept: RESPIRATORY THERAPY | Facility: HOSPITAL | Age: 64
Discharge: HOME | End: 2024-06-05
Payer: COMMERCIAL

## 2024-06-05 DIAGNOSIS — R06.02 SOB (SHORTNESS OF BREATH): ICD-10-CM

## 2024-06-05 PROCEDURE — 94640 AIRWAY INHALATION TREATMENT: CPT

## 2024-06-05 PROCEDURE — 94726 PLETHYSMOGRAPHY LUNG VOLUMES: CPT

## 2024-06-05 PROCEDURE — 2500000001 HC RX 250 WO HCPCS SELF ADMINISTERED DRUGS (ALT 637 FOR MEDICARE OP): Performed by: STUDENT IN AN ORGANIZED HEALTH CARE EDUCATION/TRAINING PROGRAM

## 2024-06-05 RX ORDER — ALBUTEROL SULFATE 90 UG/1
4 AEROSOL, METERED RESPIRATORY (INHALATION) ONCE
Status: COMPLETED | OUTPATIENT
Start: 2024-06-05 | End: 2024-06-05

## 2024-06-05 RX ADMIN — ALBUTEROL SULFATE 4 PUFF: 90 AEROSOL, METERED RESPIRATORY (INHALATION) at 13:30

## 2024-06-06 DIAGNOSIS — G89.29 OTHER CHRONIC PAIN: ICD-10-CM

## 2024-06-06 RX ORDER — IBUPROFEN 800 MG/1
800 TABLET ORAL 2 TIMES DAILY PRN
Qty: 60 TABLET | Refills: 1 | Status: SHIPPED | OUTPATIENT
Start: 2024-06-06

## 2024-06-09 LAB
MGC ASCENT PFT - FEV1 - POST: 2.61
MGC ASCENT PFT - FEV1 - PRE: 2.44
MGC ASCENT PFT - FEV1 - PREDICTED: 2.7
MGC ASCENT PFT - FVC - POST: 3.53
MGC ASCENT PFT - FVC - PRE: 3.51
MGC ASCENT PFT - FVC - PREDICTED: 3.48

## 2024-06-10 ENCOUNTER — TELEPHONE (OUTPATIENT)
Dept: PRIMARY CARE | Facility: CLINIC | Age: 64
End: 2024-06-10
Payer: COMMERCIAL

## 2024-06-10 NOTE — TELEPHONE ENCOUNTER
----- Message from Bren Caceres DO sent at 6/10/2024  6:57 AM EDT -----  Normal pulmonary function testing.

## 2024-06-13 ENCOUNTER — APPOINTMENT (OUTPATIENT)
Dept: PHYSICAL THERAPY | Facility: CLINIC | Age: 64
End: 2024-06-13
Payer: COMMERCIAL

## 2024-06-13 NOTE — PROGRESS NOTES
Physical Therapy    EVALUATION AND TREATMENT    Name: Naomie Muse  MRN: [id number]  : 1960  Today's Date: 24          Assessment:        Insurance:  Visit number: 1  Insurance Type: ***  Approved # of visits: ***  Authorization Needed: ***  Cert Date Ends On: ***    Plan:      Planned interventions include: biofeedback, cryotherapy, education/instruction, electrical stimulation, gait training, home program, hot pack, kinesiotaping, manual therapy, neuromuscular re-education, self care/home management, therapeutic activities, and therapeutic exercises.       Current Problem:  No diagnosis found.    Subjective   General:     Precautions:        PMH: ***  Fall Risk: ***  Pain:       Objective   External and internal assessment explained. Verbal consent obtained to proceed with vaginal exam.    External Vaginal Observation:  Voluntary Contraction:   Voluntary Relaxation:   Involuntary Contraction:   Involuntary Relaxation:    Vaginal palpation external:   1 o'clock (ischiocavernosus)  2 o'clock (bulbocavernosus)  3 o'clock (superficial transverse perineal)  4 o'clock (pubococcygeus)  5 o'clock (iliococcygeus)  6 o'clock (coccyx)  7 o'clock (iliococcygeus)  8 o'clock (pubococcygeus)  9 o'clock (superficial transverse perineal)  10 o'clock (bulbocavernosus)  11 o'clock (ischiocavernosus)  12 o'clock (pubic symphysis inferior angle)  Obturator:  Piriformis:    Vaginal palpation internal:  1 o'clock (ischiocavernosus)  2 o'clock (bulbocavernosus)  3 o'clock (superficial transverse perineal)  4 o'clock (pubococcygeus)  5 o'clock (iliococcygeus)  6 o'clock (coccyx)  7 o'clock (iliococcygeus)  8 o'clock (pubococcygeus)  9 o'clock (superficial transverse perineal)  10 o'clock (bulbocavernosus)  11 o'clock (ischiocavernosus)  12 o'clock (pubic symphysis inferior angle)  Obturator:  Piriformis:    Pelvic Floor MMT Grade:  0/zero: no palpable contraction/squeeze  1/trace: flicker of squeeze or contraction  2/poor:  squeeze pressure asymmetrical or felt at various points- no lift or displacement  3/fair: squeeze pressure (contraction) and lift or displacement  4/good: squeeze pressure (contraction) and lift or displacement from anterior, posterior, and side walls  5/strong: full circumference of finger compressed, displaced with an inward pull    [place] PERF(Power/Endurance/Repetitions/Fast Twitch)  */*/*/*    Ortho Screen:  AROM:    PROM/Joint Mobility:    Strength:    Special Tests:    Palpation:    Observation:     SL stance:    Outcome Measure:       Careplan Goals:  1. Pt will be independent in HEP to maximize PT POC   2. Pt will improve NIH CPSI by >50% raw score  3. Pt will be able to improve worst pain severity on NPRS by >2 points MCIPAWAN Pedersen, PT

## 2024-07-23 ENCOUNTER — EVALUATION (OUTPATIENT)
Dept: PHYSICAL THERAPY | Facility: CLINIC | Age: 64
End: 2024-07-23
Payer: MEDICARE

## 2024-07-23 DIAGNOSIS — N39.43 POST-VOID DRIBBLING: ICD-10-CM

## 2024-08-17 DIAGNOSIS — R23.8 DRY SCALP: ICD-10-CM

## 2024-08-19 RX ORDER — KETOCONAZOLE 20 MG/ML
SHAMPOO, SUSPENSION TOPICAL
Qty: 120 ML | Refills: 1 | Status: SHIPPED | OUTPATIENT
Start: 2024-08-19

## 2024-09-17 ENCOUNTER — TELEPHONE (OUTPATIENT)
Dept: PRIMARY CARE | Facility: CLINIC | Age: 64
End: 2024-09-17
Payer: MEDICARE

## 2024-09-17 NOTE — TELEPHONE ENCOUNTER
Ongoing SW/CM Assessment/Plan of Care Note     See SW/CM flowsheets for goals and other objective data.    Patient/Family discharge goal (s):  Goal #1: Psychosocial needs assessed          PT Recommendation:  Recommendation for Discharge: PT: Sub-acute nursing home    OT Recommendation:  Recommendations for Discharge: OT: Sub-acute nursing home    SLP Recommendation:  Recommendations for Discharge: SLP: IRP declined, plan for [facility]    Disposition:       Progress note:   SW following for D/C planning. Aware IRP declined pt, and recommending sub-acute rehab upon discharge. Informed by unit  that, per Sondra at [facility], pt does not have to go to a [facility]-contracted facility anymore, as [facility] is already approved. List of contracted [facility] sub-acute rehab facilities obtained, and referrals made to all 13 facilities, with pt's permission.  Hep B Surface antibody result obtained and faxed to [facility] this morning - continuing to await outpatient dialysis arrangements. Transportation to and from outpatient dialysis to be determined depending on rehab facility and dialysis facility location.  Patient updated. SW will continue to follow.         faxed

## 2024-10-24 DIAGNOSIS — E11.9 TYPE 2 DIABETES MELLITUS WITHOUT COMPLICATION, WITHOUT LONG-TERM CURRENT USE OF INSULIN (MULTI): ICD-10-CM

## 2024-10-25 RX ORDER — GLIPIZIDE 10 MG/1
10 TABLET, FILM COATED, EXTENDED RELEASE ORAL DAILY
Qty: 30 TABLET | Refills: 10 | Status: SHIPPED | OUTPATIENT
Start: 2024-10-25

## 2024-11-15 ENCOUNTER — APPOINTMENT (OUTPATIENT)
Dept: PRIMARY CARE | Facility: CLINIC | Age: 64
End: 2024-11-15
Payer: COMMERCIAL

## 2024-11-15 VITALS
HEART RATE: 92 BPM | HEIGHT: 69 IN | SYSTOLIC BLOOD PRESSURE: 146 MMHG | DIASTOLIC BLOOD PRESSURE: 89 MMHG | WEIGHT: 222 LBS | OXYGEN SATURATION: 96 % | BODY MASS INDEX: 32.88 KG/M2

## 2024-11-15 DIAGNOSIS — I10 ESSENTIAL HYPERTENSION: ICD-10-CM

## 2024-11-15 DIAGNOSIS — E11.41 TYPE 2 DIABETES MELLITUS WITH DIABETIC MONONEUROPATHY, WITHOUT LONG-TERM CURRENT USE OF INSULIN: ICD-10-CM

## 2024-11-15 DIAGNOSIS — E11.9 TYPE 2 DIABETES MELLITUS WITHOUT COMPLICATION, WITHOUT LONG-TERM CURRENT USE OF INSULIN (MULTI): ICD-10-CM

## 2024-11-15 DIAGNOSIS — N32.81 OAB (OVERACTIVE BLADDER): ICD-10-CM

## 2024-11-15 DIAGNOSIS — E78.5 DM TYPE 2 WITH DIABETIC DYSLIPIDEMIA (MULTI): Chronic | ICD-10-CM

## 2024-11-15 DIAGNOSIS — E11.69 DM TYPE 2 WITH DIABETIC DYSLIPIDEMIA (MULTI): Chronic | ICD-10-CM

## 2024-11-15 DIAGNOSIS — Z00.00 MEDICARE ANNUAL WELLNESS VISIT, SUBSEQUENT: Primary | ICD-10-CM

## 2024-11-15 PROCEDURE — 3008F BODY MASS INDEX DOCD: CPT | Performed by: STUDENT IN AN ORGANIZED HEALTH CARE EDUCATION/TRAINING PROGRAM

## 2024-11-15 PROCEDURE — 3077F SYST BP >= 140 MM HG: CPT | Performed by: STUDENT IN AN ORGANIZED HEALTH CARE EDUCATION/TRAINING PROGRAM

## 2024-11-15 PROCEDURE — 1170F FXNL STATUS ASSESSED: CPT | Performed by: STUDENT IN AN ORGANIZED HEALTH CARE EDUCATION/TRAINING PROGRAM

## 2024-11-15 PROCEDURE — G0009 ADMIN PNEUMOCOCCAL VACCINE: HCPCS | Performed by: STUDENT IN AN ORGANIZED HEALTH CARE EDUCATION/TRAINING PROGRAM

## 2024-11-15 PROCEDURE — 1160F RVW MEDS BY RX/DR IN RCRD: CPT | Performed by: STUDENT IN AN ORGANIZED HEALTH CARE EDUCATION/TRAINING PROGRAM

## 2024-11-15 PROCEDURE — G0439 PPPS, SUBSEQ VISIT: HCPCS | Performed by: STUDENT IN AN ORGANIZED HEALTH CARE EDUCATION/TRAINING PROGRAM

## 2024-11-15 PROCEDURE — 1159F MED LIST DOCD IN RCRD: CPT | Performed by: STUDENT IN AN ORGANIZED HEALTH CARE EDUCATION/TRAINING PROGRAM

## 2024-11-15 PROCEDURE — 90677 PCV20 VACCINE IM: CPT | Performed by: STUDENT IN AN ORGANIZED HEALTH CARE EDUCATION/TRAINING PROGRAM

## 2024-11-15 PROCEDURE — 1158F ADVNC CARE PLAN TLK DOCD: CPT | Performed by: STUDENT IN AN ORGANIZED HEALTH CARE EDUCATION/TRAINING PROGRAM

## 2024-11-15 PROCEDURE — 3079F DIAST BP 80-89 MM HG: CPT | Performed by: STUDENT IN AN ORGANIZED HEALTH CARE EDUCATION/TRAINING PROGRAM

## 2024-11-15 PROCEDURE — 1123F ACP DISCUSS/DSCN MKR DOCD: CPT | Performed by: STUDENT IN AN ORGANIZED HEALTH CARE EDUCATION/TRAINING PROGRAM

## 2024-11-15 PROCEDURE — 99397 PER PM REEVAL EST PAT 65+ YR: CPT | Performed by: STUDENT IN AN ORGANIZED HEALTH CARE EDUCATION/TRAINING PROGRAM

## 2024-11-15 PROCEDURE — 99214 OFFICE O/P EST MOD 30 MIN: CPT | Performed by: STUDENT IN AN ORGANIZED HEALTH CARE EDUCATION/TRAINING PROGRAM

## 2024-11-15 PROCEDURE — 1036F TOBACCO NON-USER: CPT | Performed by: STUDENT IN AN ORGANIZED HEALTH CARE EDUCATION/TRAINING PROGRAM

## 2024-11-15 RX ORDER — OXYBUTYNIN CHLORIDE 5 MG/1
5 TABLET ORAL DAILY
Qty: 30 TABLET | Refills: 0 | Status: SHIPPED | OUTPATIENT
Start: 2024-11-15 | End: 2025-05-14

## 2024-11-15 RX ORDER — AMLODIPINE BESYLATE 5 MG/1
5 TABLET ORAL DAILY
Qty: 30 TABLET | Refills: 0 | Status: SHIPPED | OUTPATIENT
Start: 2024-11-15 | End: 2025-05-14

## 2024-11-15 ASSESSMENT — ENCOUNTER SYMPTOMS
DEPRESSION: 1
SHORTNESS OF BREATH: 0
DECREASED CONCENTRATION: 0
DIZZINESS: 0
WHEEZING: 0
COUGH: 0
DYSURIA: 0
LOSS OF SENSATION IN FEET: 0
CONFUSION: 0
APPETITE CHANGE: 0
FLANK PAIN: 0
ACTIVITY CHANGE: 0
FATIGUE: 0
DIFFICULTY URINATING: 0
FEVER: 0
HEADACHES: 1
PALPITATIONS: 0
OCCASIONAL FEELINGS OF UNSTEADINESS: 0
FACIAL ASYMMETRY: 0

## 2024-11-15 ASSESSMENT — ACTIVITIES OF DAILY LIVING (ADL)
DRESSING: INDEPENDENT
BATHING: INDEPENDENT
TAKING_MEDICATION: INDEPENDENT
DOING_HOUSEWORK: INDEPENDENT
MANAGING_FINANCES: INDEPENDENT
GROCERY_SHOPPING: INDEPENDENT

## 2024-11-15 ASSESSMENT — PATIENT HEALTH QUESTIONNAIRE - PHQ9
SUM OF ALL RESPONSES TO PHQ9 QUESTIONS 1 AND 2: 1
10. IF YOU CHECKED OFF ANY PROBLEMS, HOW DIFFICULT HAVE THESE PROBLEMS MADE IT FOR YOU TO DO YOUR WORK, TAKE CARE OF THINGS AT HOME, OR GET ALONG WITH OTHER PEOPLE: NOT DIFFICULT AT ALL
1. LITTLE INTEREST OR PLEASURE IN DOING THINGS: NOT AT ALL
2. FEELING DOWN, DEPRESSED OR HOPELESS: SEVERAL DAYS

## 2024-11-15 NOTE — ASSESSMENT & PLAN NOTE
Cut back on ibuprofen use  Continue chlorthalidone, will add norvasc  Recheck in 1 month  Goal BP less than 130/80  Work on maintaining a healthy weight, monitoring sodium intake, consistent activity and exercise  Do not use sudafed/pseudoephedrine for decongestant when ill  Orders:    amLODIPine (Norvasc) 5 mg tablet; Take 1 tablet (5 mg) by mouth once daily.

## 2024-11-15 NOTE — ASSESSMENT & PLAN NOTE
Has not completed labs, please go today so we can titrate medications accordingly   Metformin 1000mg BID, Januvia 5mg BID     Diabetes Preventative Measures:   Lipid: due for lipid panel, on lipitor 40mg  BP: At goal  ACE/ARB: not presently on an ACE     Last foot exam 11/2023- does have decreased vibratory sensation on the L side, otherwise normal

## 2024-11-15 NOTE — PATIENT INSTRUCTIONS
Please complete labs  New medication for blood pressure(take with your other pill) and new medication for bladder

## 2024-11-15 NOTE — ASSESSMENT & PLAN NOTE
PNEUMONIA vaccine- Ordered  SHINGLES vaccine- Declined   INFLUENZA vaccine- Completed     Screening tests:  Colon cancer screening--> Due 2026, cologuard  Breast Cancer screening--> UTD  Cervical Cancer Screening--> Not indicated  DXA screening-->     During the course of the visit the patient was educated and counseled about age appropriate screening and preventive services. Completed preventive screenings were documented in the chart and orders were placed for outstanding screenings/procedures as documented in the Assessment and Plan.    Patient Instructions (the written plan) was given to the patient at check out that include any community based lifestyle interventions.    Other risk factors and conditions for which interventions are recommended are addressed as the other tagged diagnoses in this encounter.

## 2024-11-15 NOTE — PROGRESS NOTES
Subjective   Reason for Visit: Naomie Muse is an 64 y.o. female here for a Medicare Wellness visit.     Past Medical, Surgical, and Family History reviewed and updated in chart.    Reviewed all medications by prescribing practitioner or clinical pharmacist (such as prescriptions, OTCs, herbal therapies and supplements) and documented in the medical record.    HPI    63 yo female presents for follow up and medicare wellness  She did not complete labs prior to this visit     Would like to discuss bladder leakage  Constant dribbling  No issues with coughing or laughing  Reports no burning or irritation     Headaches, BP at home elevated    Patient Care Team:  Bren Caceres DO as PCP - General (Family Medicine)  Bren Caceres DO as PCP - [employer] PCP  Robson Edwards MD as Surgeon (Urology)     Past Medical History:   Diagnosis Date    Other specified anxiety disorders     Depression with anxiety     Past Surgical History:   Procedure Laterality Date    CT ABDOMEN PELVIS ANGIOGRAM W AND/OR WO IV CONTRAST  2/17/2022    CT ABDOMEN PELVIS ANGIOGRAM W AND/OR WO IV CONTRAST 2/17/2022 [facility]    OTHER SURGICAL HISTORY  12/15/2020    [place] tooth extraction    OTHER SURGICAL HISTORY  02/16/2021    Tubal ligation    OTHER SURGICAL HISTORY  08/26/2022    Mass excision    OTHER SURGICAL HISTORY  07/26/2022    Lower leg fracture repair     Family History   Problem Relation Name Age of Onset    Other (CARDIAC DISORDER) Mother      Hypertension Mother      Heart attack Mother       Body mass index is 32.78 kg/m².    Tobacco/Alcohol/Opioid use, as well as Illicit Drug Use was screened for/reviewed and documented in Social History section and medication list as appropriate    Medications and Supplements  prescribed by me and other practitioners or clinical pharmacist (such as prescriptions, OTC's, herbal therapies and supplements) were reviewed and documented in the medical record.   "  Tobacco/Alcohol/Opioid use, as well as Illicit Drug Use was screened for/reviewed and documented in Social History section and medication list as appropriate    Review of Systems   Constitutional:  Negative for activity change, appetite change, fatigue and fever.   Respiratory:  Negative for cough, shortness of breath and wheezing.    Cardiovascular:  Negative for chest pain, palpitations and leg swelling.   Genitourinary:  Positive for urgency. Negative for decreased urine volume, difficulty urinating, dysuria and flank pain.   Allergic/Immunologic: Negative for immunocompromised state.   Neurological:  Positive for headaches. Negative for dizziness and facial asymmetry.   Psychiatric/Behavioral:  Negative for confusion and decreased concentration.      Objective   Vitals:  /89   Pulse 92   Ht 1.753 m (5' 9\")   Wt 101 kg (222 lb)   SpO2 96%   BMI 32.78 kg/m²       Physical Exam  Constitutional:       Appearance: Normal appearance.   HENT:      Head: Normocephalic and atraumatic.   Cardiovascular:      Rate and Rhythm: Normal rate and regular rhythm.   Pulmonary:      Effort: Pulmonary effort is normal.   Skin:     Coloration: Skin is not jaundiced or pale.   Neurological:      Mental Status: She is alert and oriented to person, place, and time.   Psychiatric:         Mood and Affect: Mood normal.         Behavior: Behavior normal.       Assessment & Plan  Medicare annual wellness visit, subsequent  PNEUMONIA vaccine- Ordered  SHINGLES vaccine- Declined   INFLUENZA vaccine- Completed     Screening tests:  Colon cancer screening--> Due 2026, cologuard  Breast Cancer screening--> UTD  Cervical Cancer Screening--> Not indicated  DXA screening-->     During the course of the visit the patient was educated and counseled about age appropriate screening and preventive services. Completed preventive screenings were documented in the chart and orders were placed for outstanding screenings/procedures as " documented in the Assessment and Plan.    Patient Instructions (the written plan) was given to the patient at check out that include any community based lifestyle interventions.    Other risk factors and conditions for which interventions are recommended are addressed as the other tagged diagnoses in this encounter.        Type 2 diabetes mellitus without complication, without long-term current use of insulin (Multi)  Orders:    Follow [facility] - PCP - Established    Follow Up In [facility] - PCP - Established; Future    Type 2 diabetes mellitus with diabetic mononeuropathy, without long-term current use of insulin  Has not completed labs, please go today so we can titrate medications accordingly   Metformin 1000mg BID, Januvia 5mg BID     Diabetes Preventative Measures:   Lipid: due for lipid panel, on lipitor 40mg  BP: At goal  ACE/ARB: not presently on an ACE     Last foot exam 11/2023- does have decreased vibratory sensation on the L side, otherwise normal        DM type 2 with diabetic dyslipidemia (Multi)  Continue atorvastatin 40mg  Pending labs  Goal LDL less than 70       OAB (overactive bladder)  Discussed pelvic floor PT and urogyn- she would like to hold and try medication first  Will trial ditropan, counseled on possible SE to look out for   Orders:    oxybutynin (Ditropan) 5 mg tablet; Take 1 tablet (5 mg) by mouth once daily.    Essential hypertension  Cut back on ibuprofen use  Continue chlorthalidone, will add norvasc  Recheck in 1 month  Goal BP less than 130/80  Work on maintaining a healthy weight, monitoring sodium intake, consistent activity and exercise  Do not use sudafed/pseudoephedrine for decongestant when ill  Orders:    amLODIPine (Norvasc) 5 mg tablet; Take 1 tablet (5 mg) by mouth once daily.

## 2024-11-19 NOTE — ASSESSMENT & PLAN NOTE
Counseled on ibuprofen potentially increasing BP, and never to be taken on an empty stomach   Will continue only PRN usage   Counseled on max daily usage parameters    DISPLAY PLAN FREE TEXT

## 2024-11-22 DIAGNOSIS — E11.9 TYPE 2 DIABETES MELLITUS WITHOUT COMPLICATION, WITHOUT LONG-TERM CURRENT USE OF INSULIN (MULTI): ICD-10-CM

## 2024-11-25 RX ORDER — METFORMIN HYDROCHLORIDE 1000 MG/1
TABLET ORAL
Qty: 60 TABLET | Refills: 10 | Status: SHIPPED | OUTPATIENT
Start: 2024-11-25

## 2024-12-13 ENCOUNTER — APPOINTMENT (OUTPATIENT)
Dept: PRIMARY CARE | Facility: CLINIC | Age: 64
End: 2024-12-13
Payer: MEDICARE

## 2024-12-13 VITALS
HEART RATE: 93 BPM | WEIGHT: 216 LBS | SYSTOLIC BLOOD PRESSURE: 102 MMHG | BODY MASS INDEX: 31.99 KG/M2 | OXYGEN SATURATION: 96 % | DIASTOLIC BLOOD PRESSURE: 60 MMHG | HEIGHT: 69 IN

## 2024-12-13 DIAGNOSIS — G89.29 OTHER CHRONIC PAIN: ICD-10-CM

## 2024-12-13 DIAGNOSIS — N32.81 OAB (OVERACTIVE BLADDER): Primary | ICD-10-CM

## 2024-12-13 DIAGNOSIS — E11.9 TYPE 2 DIABETES MELLITUS WITHOUT COMPLICATION, WITHOUT LONG-TERM CURRENT USE OF INSULIN (MULTI): ICD-10-CM

## 2024-12-13 DIAGNOSIS — E11.69 DM TYPE 2 WITH DIABETIC DYSLIPIDEMIA (MULTI): Chronic | ICD-10-CM

## 2024-12-13 DIAGNOSIS — I10 ESSENTIAL HYPERTENSION: ICD-10-CM

## 2024-12-13 DIAGNOSIS — E78.5 HYPERLIPIDEMIA, UNSPECIFIED HYPERLIPIDEMIA TYPE: ICD-10-CM

## 2024-12-13 DIAGNOSIS — E78.5 DM TYPE 2 WITH DIABETIC DYSLIPIDEMIA (MULTI): Chronic | ICD-10-CM

## 2024-12-13 DIAGNOSIS — E11.41 TYPE 2 DIABETES MELLITUS WITH DIABETIC MONONEUROPATHY, WITHOUT LONG-TERM CURRENT USE OF INSULIN: Chronic | ICD-10-CM

## 2024-12-13 DIAGNOSIS — R23.8 DRY SCALP: ICD-10-CM

## 2024-12-13 LAB — POC HEMOGLOBIN A1C: 7.9 % (ref 4.2–6.5)

## 2024-12-13 PROCEDURE — 3078F DIAST BP <80 MM HG: CPT | Performed by: STUDENT IN AN ORGANIZED HEALTH CARE EDUCATION/TRAINING PROGRAM

## 2024-12-13 PROCEDURE — 83036 HEMOGLOBIN GLYCOSYLATED A1C: CPT | Performed by: STUDENT IN AN ORGANIZED HEALTH CARE EDUCATION/TRAINING PROGRAM

## 2024-12-13 PROCEDURE — 1036F TOBACCO NON-USER: CPT | Performed by: STUDENT IN AN ORGANIZED HEALTH CARE EDUCATION/TRAINING PROGRAM

## 2024-12-13 PROCEDURE — 3008F BODY MASS INDEX DOCD: CPT | Performed by: STUDENT IN AN ORGANIZED HEALTH CARE EDUCATION/TRAINING PROGRAM

## 2024-12-13 PROCEDURE — 3074F SYST BP LT 130 MM HG: CPT | Performed by: STUDENT IN AN ORGANIZED HEALTH CARE EDUCATION/TRAINING PROGRAM

## 2024-12-13 PROCEDURE — 1159F MED LIST DOCD IN RCRD: CPT | Performed by: STUDENT IN AN ORGANIZED HEALTH CARE EDUCATION/TRAINING PROGRAM

## 2024-12-13 PROCEDURE — 99214 OFFICE O/P EST MOD 30 MIN: CPT | Performed by: STUDENT IN AN ORGANIZED HEALTH CARE EDUCATION/TRAINING PROGRAM

## 2024-12-13 RX ORDER — DIFLORASONE DIACETATE 0.5 MG/G
1 CREAM TOPICAL 2 TIMES DAILY
Qty: 60 G | Refills: 3 | Status: SHIPPED | OUTPATIENT
Start: 2024-12-13

## 2024-12-13 RX ORDER — TIRZEPATIDE 2.5 MG/.5ML
2.5 INJECTION, SOLUTION SUBCUTANEOUS WEEKLY
Qty: 3 ML | Refills: 0 | Status: SHIPPED | OUTPATIENT
Start: 2024-12-13

## 2024-12-13 RX ORDER — HYDROXYZINE HYDROCHLORIDE 10 MG/1
10 TABLET, FILM COATED ORAL 3 TIMES DAILY PRN
Qty: 30 TABLET | Refills: 0 | Status: SHIPPED | OUTPATIENT
Start: 2024-12-13 | End: 2025-01-12

## 2024-12-13 RX ORDER — IBUPROFEN 800 MG/1
800 TABLET ORAL 2 TIMES DAILY PRN
Qty: 60 TABLET | Refills: 1 | Status: SHIPPED | OUTPATIENT
Start: 2024-12-13

## 2024-12-13 ASSESSMENT — PATIENT HEALTH QUESTIONNAIRE - PHQ9
SUM OF ALL RESPONSES TO PHQ9 QUESTIONS 1 AND 2: 1
1. LITTLE INTEREST OR PLEASURE IN DOING THINGS: NOT AT ALL
2. FEELING DOWN, DEPRESSED OR HOPELESS: SEVERAL DAYS
10. IF YOU CHECKED OFF ANY PROBLEMS, HOW DIFFICULT HAVE THESE PROBLEMS MADE IT FOR YOU TO DO YOUR WORK, TAKE CARE OF THINGS AT HOME, OR GET ALONG WITH OTHER PEOPLE: NOT DIFFICULT AT ALL
2. FEELING DOWN, DEPRESSED OR HOPELESS: NOT AT ALL
SUM OF ALL RESPONSES TO PHQ9 QUESTIONS 1 AND 2: 0
1. LITTLE INTEREST OR PLEASURE IN DOING THINGS: NOT AT ALL

## 2024-12-13 ASSESSMENT — ENCOUNTER SYMPTOMS
LOSS OF SENSATION IN FEET: 0
CONSTIPATION: 0
FATIGUE: 0
FEVER: 0
ABDOMINAL PAIN: 0
COUGH: 0
DEPRESSION: 1
DECREASED CONCENTRATION: 0
SHORTNESS OF BREATH: 0
OCCASIONAL FEELINGS OF UNSTEADINESS: 1
CONFUSION: 0
DIARRHEA: 0
WHEEZING: 0

## 2024-12-13 NOTE — PROGRESS NOTES
"Patient Name:  Naomie Muse  MRN:  [id number]  :  1960    Subjective   Patient ID: Naomie Muse is a 64 y.o. female who presents for Follow-up (Pt here for refills ).    HPI     65 yo female here for follow up on OAB medication, increase in BP medication, and A1c check     Bladder medication seems to be working well, symptoms gone     BP doing well     Feels like her glucoses are high, getting nauseous   Hasn't been checking  Has supplies     Metformin 1000mg once daily, glipizide 10mg    Review of Systems   Constitutional:  Negative for fatigue and fever.   HENT:  Positive for postnasal drip.    Respiratory:  Negative for cough, shortness of breath and wheezing.    Cardiovascular:  Negative for chest pain.   Gastrointestinal:  Negative for abdominal pain, constipation and diarrhea.   Psychiatric/Behavioral:  Negative for confusion and decreased concentration.      Objective   /60   Pulse 93   Ht 1.753 m (5' 9\")   Wt 98 kg (216 lb)   SpO2 96%   BMI 31.90 kg/m²     Physical Exam    Assessment/Plan   Problem List Items Addressed This Visit             ICD-10-CM    Chronic pain (Chronic) G89.29    Relevant Medications    ibuprofen 800 mg tablet    Type 2 diabetes mellitus with diabetic mononeuropathy, without long-term current use of insulin (Chronic) E11.41     Poor control  Metformin 1000mg once daily, glipizide 10mg  Adding mounjaro, review of SE EDMUND, questions answered, she would like to move forward with once weekly medication, will work better with her schedule  Diabetes Preventative Measures:   Lipid: due for lipid panel, on lipitor 40mg  BP: At goal  ACE/ARB: not presently on an ACE         Relevant Medications    tirzepatide (Mounjaro) 2.5 mg/0.5 mL pen injector    Essential hypertension I10     Goal BP less than 130/80  Continue norvsac and chlorthalidone, due for CMP ordered  Work on maintaining a healthy weight, monitoring sodium intake, consistent activity and exercise  Avoid chronic " use of NSAIDs  Do not use sudafed/pseudoephedrine for decongestant when ill           RESOLVED: Hyperlipidemia E78.5    DM type 2 with diabetic dyslipidemia (Multi) (Chronic) E11.69, E78.5    Relevant Orders    Lipid Panel    Dry scalp R23.8    Relevant Medications    hydrOXYzine HCL (Atarax) 10 mg tablet    diflorasone (Psorcon) 0.05 % cream    OAB (overactive bladder) - Primary (Chronic) N32.81     Doing well with ditropan, symptoms resolved and no SE noted           Other Visit Diagnoses         Codes    Type 2 diabetes mellitus without complication, without long-term current use of insulin (Multi)     E11.9    Relevant Medications    tirzepatide (Mounjaro) 2.5 mg/0.5 mL pen injector    Other Relevant Orders    POCT glycosylated hemoglobin (Hb A1C) manually resulted (Completed)    Follow Up In [facility] - PCP - Established    Hemoglobin A1C    Comprehensive Metabolic Panel    Albumin-Creatinine Ratio, Urine Random

## 2024-12-13 NOTE — ASSESSMENT & PLAN NOTE
Goal BP less than 130/80  Continue norvsac and chlorthalidone, due for CMP ordered  Work on maintaining a healthy weight, monitoring sodium intake, consistent activity and exercise  Avoid chronic use of NSAIDs  Do not use sudafed/pseudoephedrine for decongestant when ill

## 2024-12-13 NOTE — ASSESSMENT & PLAN NOTE
Poor control  Metformin 1000mg once daily, glipizide 10mg  Adding mounjaro, review of SE EDMUND, questions answered, she would like to move forward with once weekly medication, will work better with her schedule  Diabetes Preventative Measures:   Lipid: due for lipid panel, on lipitor 40mg  BP: At goal  ACE/ARB: not presently on an ACE

## 2024-12-16 DIAGNOSIS — N32.81 OAB (OVERACTIVE BLADDER): ICD-10-CM

## 2024-12-18 RX ORDER — OXYBUTYNIN CHLORIDE 5 MG/1
5 TABLET ORAL DAILY
Qty: 90 TABLET | Refills: 2 | Status: SHIPPED | OUTPATIENT
Start: 2024-12-18

## 2024-12-24 DIAGNOSIS — E11.9 TYPE 2 DIABETES MELLITUS WITHOUT COMPLICATION, WITHOUT LONG-TERM CURRENT USE OF INSULIN (MULTI): ICD-10-CM

## 2024-12-26 RX ORDER — ATORVASTATIN CALCIUM 40 MG/1
40 TABLET, FILM COATED ORAL DAILY
Qty: 30 TABLET | Refills: 10 | Status: SHIPPED | OUTPATIENT
Start: 2024-12-26

## 2025-01-24 DIAGNOSIS — E11.9 TYPE 2 DIABETES MELLITUS WITHOUT COMPLICATION, WITHOUT LONG-TERM CURRENT USE OF INSULIN (MULTI): ICD-10-CM

## 2025-01-27 RX ORDER — OMEPRAZOLE 20 MG/1
20 CAPSULE, DELAYED RELEASE ORAL DAILY
Qty: 30 CAPSULE | Refills: 10 | Status: SHIPPED | OUTPATIENT
Start: 2025-01-27

## 2025-02-13 ENCOUNTER — APPOINTMENT (OUTPATIENT)
Dept: PRIMARY CARE | Facility: CLINIC | Age: 65
End: 2025-02-13
Payer: MEDICARE

## 2025-02-13 VITALS
HEIGHT: 69 IN | DIASTOLIC BLOOD PRESSURE: 84 MMHG | OXYGEN SATURATION: 95 % | BODY MASS INDEX: 31.7 KG/M2 | HEART RATE: 100 BPM | SYSTOLIC BLOOD PRESSURE: 138 MMHG | WEIGHT: 214 LBS

## 2025-02-13 DIAGNOSIS — G89.29 OTHER CHRONIC PAIN: ICD-10-CM

## 2025-02-13 DIAGNOSIS — E78.5 DM TYPE 2 WITH DIABETIC DYSLIPIDEMIA (MULTI): Chronic | ICD-10-CM

## 2025-02-13 DIAGNOSIS — E11.41 TYPE 2 DIABETES MELLITUS WITH DIABETIC MONONEUROPATHY, WITHOUT LONG-TERM CURRENT USE OF INSULIN: Primary | Chronic | ICD-10-CM

## 2025-02-13 DIAGNOSIS — R23.8 DRY SCALP: ICD-10-CM

## 2025-02-13 DIAGNOSIS — N32.81 OAB (OVERACTIVE BLADDER): ICD-10-CM

## 2025-02-13 DIAGNOSIS — E11.69 DM TYPE 2 WITH DIABETIC DYSLIPIDEMIA (MULTI): Chronic | ICD-10-CM

## 2025-02-13 PROCEDURE — 99214 OFFICE O/P EST MOD 30 MIN: CPT | Performed by: STUDENT IN AN ORGANIZED HEALTH CARE EDUCATION/TRAINING PROGRAM

## 2025-02-13 PROCEDURE — 3075F SYST BP GE 130 - 139MM HG: CPT | Performed by: STUDENT IN AN ORGANIZED HEALTH CARE EDUCATION/TRAINING PROGRAM

## 2025-02-13 PROCEDURE — 3008F BODY MASS INDEX DOCD: CPT | Performed by: STUDENT IN AN ORGANIZED HEALTH CARE EDUCATION/TRAINING PROGRAM

## 2025-02-13 PROCEDURE — 1036F TOBACCO NON-USER: CPT | Performed by: STUDENT IN AN ORGANIZED HEALTH CARE EDUCATION/TRAINING PROGRAM

## 2025-02-13 PROCEDURE — 1159F MED LIST DOCD IN RCRD: CPT | Performed by: STUDENT IN AN ORGANIZED HEALTH CARE EDUCATION/TRAINING PROGRAM

## 2025-02-13 PROCEDURE — 3079F DIAST BP 80-89 MM HG: CPT | Performed by: STUDENT IN AN ORGANIZED HEALTH CARE EDUCATION/TRAINING PROGRAM

## 2025-02-13 RX ORDER — OMEGA-3-ACID ETHYL ESTERS 1 G/1
2 CAPSULE, LIQUID FILLED ORAL 2 TIMES DAILY
Qty: 180 CAPSULE | Refills: 3 | Status: SHIPPED | OUTPATIENT
Start: 2025-02-13

## 2025-02-13 RX ORDER — DIFLORASONE DIACETATE 0.5 MG/G
1 CREAM TOPICAL 2 TIMES DAILY
Qty: 60 G | Refills: 3 | Status: SHIPPED | OUTPATIENT
Start: 2025-02-13

## 2025-02-13 RX ORDER — HYDROXYZINE HYDROCHLORIDE 10 MG/1
10 TABLET, FILM COATED ORAL 3 TIMES DAILY PRN
Qty: 30 TABLET | Refills: 0 | Status: SHIPPED | OUTPATIENT
Start: 2025-02-13 | End: 2025-03-15

## 2025-02-13 RX ORDER — OXYBUTYNIN CHLORIDE 5 MG/1
5 TABLET ORAL DAILY
Qty: 90 TABLET | Refills: 2 | Status: SHIPPED | OUTPATIENT
Start: 2025-02-13

## 2025-02-13 RX ORDER — IBUPROFEN 800 MG/1
800 TABLET ORAL 2 TIMES DAILY PRN
Qty: 60 TABLET | Refills: 1 | Status: SHIPPED | OUTPATIENT
Start: 2025-02-13

## 2025-02-13 ASSESSMENT — ENCOUNTER SYMPTOMS
DEPRESSION: 1
CONFUSION: 0
FEVER: 0
DYSPHORIC MOOD: 0
OCCASIONAL FEELINGS OF UNSTEADINESS: 1
LOSS OF SENSATION IN FEET: 0
WHEEZING: 0
FATIGUE: 0
SHORTNESS OF BREATH: 0
DECREASED CONCENTRATION: 0

## 2025-02-13 ASSESSMENT — COLUMBIA-SUICIDE SEVERITY RATING SCALE - C-SSRS
1. IN THE PAST MONTH, HAVE YOU WISHED YOU WERE DEAD OR WISHED YOU COULD GO TO SLEEP AND NOT WAKE UP?: NO
2. HAVE YOU ACTUALLY HAD ANY THOUGHTS OF KILLING YOURSELF?: NO
6. HAVE YOU EVER DONE ANYTHING, STARTED TO DO ANYTHING, OR PREPARED TO DO ANYTHING TO END YOUR LIFE?: NO

## 2025-02-13 ASSESSMENT — PATIENT HEALTH QUESTIONNAIRE - PHQ9
10. IF YOU CHECKED OFF ANY PROBLEMS, HOW DIFFICULT HAVE THESE PROBLEMS MADE IT FOR YOU TO DO YOUR WORK, TAKE CARE OF THINGS AT HOME, OR GET ALONG WITH OTHER PEOPLE: NOT DIFFICULT AT ALL
1. LITTLE INTEREST OR PLEASURE IN DOING THINGS: NOT AT ALL
2. FEELING DOWN, DEPRESSED OR HOPELESS: SEVERAL DAYS
1. LITTLE INTEREST OR PLEASURE IN DOING THINGS: NOT AT ALL
SUM OF ALL RESPONSES TO PHQ9 QUESTIONS 1 AND 2: 1
2. FEELING DOWN, DEPRESSED OR HOPELESS: NOT AT ALL
SUM OF ALL RESPONSES TO PHQ9 QUESTIONS 1 AND 2: 0

## 2025-02-13 NOTE — ASSESSMENT & PLAN NOTE
Doing well with mounjaro, continue  Continues metformin 1000mg BID, glipizide 10mg   Diabetes Preventative Measures:   Lipid: due for lipid panel, on lipitor 40mg  BP: At goal  ACE/ARB: not presently on an ACE

## 2025-02-13 NOTE — PROGRESS NOTES
"Patient Name:  Naomie Muse  MRN:  [id number]  :  1960    Subjective   Patient ID: Naomie Muse is a 64 y.o. female who presents for Follow-up.    HPI     63 yo female presents for medication follow up    LV started on mounjaro for poor BP control   216-214lbs  No SE is doing well with application     Review of Systems   Constitutional:  Negative for fatigue and fever.   Respiratory:  Negative for shortness of breath and wheezing.    Cardiovascular:  Negative for chest pain.   Allergic/Immunologic: Negative for immunocompromised state.   Psychiatric/Behavioral:  Negative for confusion, decreased concentration and dysphoric mood.      Objective   /84   Pulse 100   Ht 1.753 m (5' 9\")   Wt 97.1 kg (214 lb)   SpO2 95%   BMI 31.60 kg/m²     Physical Exam  Constitutional:       Appearance: Normal appearance.   HENT:      Head: Normocephalic and atraumatic.   Cardiovascular:      Rate and Rhythm: Normal rate and regular rhythm.   Pulmonary:      Effort: Pulmonary effort is normal.   Neurological:      General: No focal deficit present.      Mental Status: She is alert and oriented to person, place, and time.   Psychiatric:         Mood and Affect: Mood normal.         Behavior: Behavior normal.     Assessment/Plan   Problem List Items Addressed This Visit             ICD-10-CM    Chronic pain (Chronic) G89.29    Relevant Medications    ibuprofen 800 mg tablet    Type 2 diabetes mellitus with diabetic mononeuropathy, without long-term current use of insulin - Primary (Chronic) E11.41     Doing well with mounjaro, continue  Continues metformin 1000mg BID, glipizide 10mg   Diabetes Preventative Measures:   Lipid: due for lipid panel, on lipitor 40mg  BP: At goal  ACE/ARB: not presently on an ACE         Relevant Orders    Follow [facility] - PCP - Established    DM type 2 with diabetic dyslipidemia (Multi) (Chronic) E11.69, E78.5     Continue atorvastatin 40mg  Goal LDL less than 70         "    Relevant Medications    omega-3 acid ethyl esters (Lovaza) 1 gram capsule    Dry scalp R23.8    Relevant Medications    hydrOXYzine HCL (Atarax) 10 mg tablet    diflorasone (Psorcon) 0.05 % cream    OAB (overactive bladder) (Chronic) N32.81     Doing well with ditropan, symptoms resolved and no SE noted          Relevant Medications    oxybutynin (Ditropan) 5 mg tablet

## 2025-03-10 DIAGNOSIS — E11.9 TYPE 2 DIABETES MELLITUS WITHOUT COMPLICATION, WITHOUT LONG-TERM CURRENT USE OF INSULIN (MULTI): ICD-10-CM

## 2025-03-10 RX ORDER — TIRZEPATIDE 2.5 MG/.5ML
2.5 INJECTION, SOLUTION SUBCUTANEOUS
Qty: 2 ML | Refills: 2 | Status: SHIPPED | OUTPATIENT
Start: 2025-03-10

## 2025-05-12 PROBLEM — M54.30 SCIATICA: Status: ACTIVE | Noted: 2025-05-12

## 2025-05-12 PROBLEM — S82.209A FRACTURE OF TIBIA AND FIBULA: Status: ACTIVE | Noted: 2025-05-12

## 2025-05-12 PROBLEM — F41.9 ANXIETY: Status: ACTIVE | Noted: 2022-08-11

## 2025-05-12 PROBLEM — S82.409A FRACTURE OF TIBIA AND FIBULA: Status: ACTIVE | Noted: 2025-05-12

## 2025-05-14 ENCOUNTER — APPOINTMENT (OUTPATIENT)
Dept: PRIMARY CARE | Facility: CLINIC | Age: 65
End: 2025-05-14
Payer: COMMERCIAL

## 2025-05-14 VITALS
OXYGEN SATURATION: 96 % | DIASTOLIC BLOOD PRESSURE: 78 MMHG | HEART RATE: 108 BPM | SYSTOLIC BLOOD PRESSURE: 118 MMHG | WEIGHT: 212 LBS | HEIGHT: 69 IN | BODY MASS INDEX: 31.4 KG/M2

## 2025-05-14 DIAGNOSIS — Z12.31 ENCOUNTER FOR SCREENING MAMMOGRAM FOR MALIGNANT NEOPLASM OF BREAST: ICD-10-CM

## 2025-05-14 DIAGNOSIS — G89.29 CHRONIC LOW BACK PAIN, UNSPECIFIED BACK PAIN LATERALITY, UNSPECIFIED WHETHER SCIATICA PRESENT: ICD-10-CM

## 2025-05-14 DIAGNOSIS — G89.29 OTHER CHRONIC PAIN: Chronic | ICD-10-CM

## 2025-05-14 DIAGNOSIS — Z00.00 ROUTINE GENERAL MEDICAL EXAMINATION AT HEALTH CARE FACILITY: Primary | ICD-10-CM

## 2025-05-14 DIAGNOSIS — R06.09 DYSPNEA ON EXERTION: ICD-10-CM

## 2025-05-14 DIAGNOSIS — E66.9 OBESITY (BMI 30-39.9): ICD-10-CM

## 2025-05-14 DIAGNOSIS — M81.8 OTHER OSTEOPOROSIS WITHOUT CURRENT PATHOLOGICAL FRACTURE: ICD-10-CM

## 2025-05-14 DIAGNOSIS — E11.41 TYPE 2 DIABETES MELLITUS WITH DIABETIC MONONEUROPATHY, WITHOUT LONG-TERM CURRENT USE OF INSULIN: Chronic | ICD-10-CM

## 2025-05-14 DIAGNOSIS — Z13.29 SCREENING FOR THYROID DISORDER: ICD-10-CM

## 2025-05-14 DIAGNOSIS — R23.8 DRY SCALP: ICD-10-CM

## 2025-05-14 DIAGNOSIS — M54.50 CHRONIC LOW BACK PAIN, UNSPECIFIED BACK PAIN LATERALITY, UNSPECIFIED WHETHER SCIATICA PRESENT: ICD-10-CM

## 2025-05-14 DIAGNOSIS — E11.9 TYPE 2 DIABETES MELLITUS WITHOUT COMPLICATION, WITHOUT LONG-TERM CURRENT USE OF INSULIN: ICD-10-CM

## 2025-05-14 DIAGNOSIS — E78.5 HYPERLIPIDEMIA, UNSPECIFIED HYPERLIPIDEMIA TYPE: ICD-10-CM

## 2025-05-14 LAB — POC HEMOGLOBIN A1C: 7.9 % (ref 4.2–6.5)

## 2025-05-14 PROCEDURE — 1170F FXNL STATUS ASSESSED: CPT

## 2025-05-14 PROCEDURE — 1159F MED LIST DOCD IN RCRD: CPT

## 2025-05-14 PROCEDURE — 3078F DIAST BP <80 MM HG: CPT

## 2025-05-14 PROCEDURE — 1160F RVW MEDS BY RX/DR IN RCRD: CPT

## 2025-05-14 PROCEDURE — G0439 PPPS, SUBSEQ VISIT: HCPCS

## 2025-05-14 PROCEDURE — 3051F HG A1C>EQUAL 7.0%<8.0%: CPT

## 2025-05-14 PROCEDURE — 83036 HEMOGLOBIN GLYCOSYLATED A1C: CPT

## 2025-05-14 PROCEDURE — 1158F ADVNC CARE PLAN TLK DOCD: CPT

## 2025-05-14 PROCEDURE — 3074F SYST BP LT 130 MM HG: CPT

## 2025-05-14 PROCEDURE — 3008F BODY MASS INDEX DOCD: CPT

## 2025-05-14 PROCEDURE — 1036F TOBACCO NON-USER: CPT

## 2025-05-14 PROCEDURE — 1123F ACP DISCUSS/DSCN MKR DOCD: CPT

## 2025-05-14 RX ORDER — TIRZEPATIDE 5 MG/.5ML
5 INJECTION, SOLUTION SUBCUTANEOUS WEEKLY
Qty: 2 ML | Refills: 3 | Status: SHIPPED | OUTPATIENT
Start: 2025-05-14

## 2025-05-14 RX ORDER — DIFLORASONE DIACETATE 0.5 MG/G
1 CREAM TOPICAL 2 TIMES DAILY
Qty: 60 G | Refills: 3 | Status: SHIPPED | OUTPATIENT
Start: 2025-05-14

## 2025-05-14 RX ORDER — TIRZEPATIDE 2.5 MG/.5ML
2.5 INJECTION, SOLUTION SUBCUTANEOUS
Qty: 2 ML | Refills: 2 | Status: SHIPPED | OUTPATIENT
Start: 2025-05-18 | End: 2025-05-14

## 2025-05-14 RX ORDER — ALBUTEROL SULFATE 90 UG/1
2 INHALANT RESPIRATORY (INHALATION) EVERY 4 HOURS PRN
Qty: 8 G | Refills: 3 | Status: SHIPPED | OUTPATIENT
Start: 2025-05-14 | End: 2026-05-14

## 2025-05-14 RX ORDER — LIDOCAINE 3.5 G/1
1 PATCH TOPICAL DAILY
Qty: 30 PATCH | Refills: 2 | Status: SHIPPED | OUTPATIENT
Start: 2025-05-14

## 2025-05-14 ASSESSMENT — PATIENT HEALTH QUESTIONNAIRE - PHQ9
1. LITTLE INTEREST OR PLEASURE IN DOING THINGS: NOT AT ALL
10. IF YOU CHECKED OFF ANY PROBLEMS, HOW DIFFICULT HAVE THESE PROBLEMS MADE IT FOR YOU TO DO YOUR WORK, TAKE CARE OF THINGS AT HOME, OR GET ALONG WITH OTHER PEOPLE: SOMEWHAT DIFFICULT
SUM OF ALL RESPONSES TO PHQ9 QUESTIONS 1 AND 2: 2
2. FEELING DOWN, DEPRESSED OR HOPELESS: MORE THAN HALF THE DAYS

## 2025-05-14 ASSESSMENT — ENCOUNTER SYMPTOMS
CONSTIPATION: 0
BLOOD IN STOOL: 0
DEPRESSION: 1
OCCASIONAL FEELINGS OF UNSTEADINESS: 0
LIGHT-HEADEDNESS: 0
FATIGUE: 0
DIFFICULTY URINATING: 0
DIZZINESS: 0
LOSS OF SENSATION IN FEET: 0
SHORTNESS OF BREATH: 1

## 2025-05-14 ASSESSMENT — ACTIVITIES OF DAILY LIVING (ADL)
DRESSING: INDEPENDENT
DOING_HOUSEWORK: INDEPENDENT
GROCERY_SHOPPING: INDEPENDENT
BATHING: INDEPENDENT
TAKING_MEDICATION: INDEPENDENT
MANAGING_FINANCES: INDEPENDENT

## 2025-05-14 NOTE — ASSESSMENT & PLAN NOTE
Orders:    diflorasone (Psorcon) 0.05 % cream; Apply 1 Application topically 2 times a day. 1-2 TIMES DAILY AS NEEDED

## 2025-05-14 NOTE — ASSESSMENT & PLAN NOTE
Lab Results   Component Value Date    HGBA1C 7.9 (A) 05/14/2025    HGBA1C 7.9 (A) 12/13/2024    HGBA1C 6.5 (A) 06/16/2023     -A1c unchanged from last year, goal A1c <7  -Will increase Mounjaro from 2.5 mg to 5 mg and follow up in 3 three months for recheck A1c  -Discussed diet and exercise  -Eye exam done in Jan 2025   -Check BG at home % keep a log  Orders:    Follow Up [facility] - PCP - Established    Albumin-Creatinine Ratio, Urine Random; Future    Comprehensive Metabolic Panel; Future    CBC and Auto Differential; Future    tirzepatide (Mounjaro) 5 mg/0.5 mL pen injector; Inject 5 mg under the skin 1 (one) time per week.    Follow Up [facility] - PCP - Established; Future

## 2025-05-14 NOTE — PROGRESS NOTES
"Subjective   Reason for Visit: Naomie Muse is an 65 y.o. female here for a Medicare Wellness visit.          Reviewed all medications by prescribing practitioner or clinical pharmacist (such as prescriptions, OTCs, herbal therapies and supplements) and documented in the medical record.    HPI    64 yo female presents for medicare wellness. Doing alright. No acute concerns today.    Patient Care Team:  Bren Caceres DO as PCP - General (Family Medicine)  Robson Edwards MD as Surgeon (Urology)     Review of Systems   Constitutional:  Negative for fatigue.   Respiratory:  Positive for shortness of breath.         SOB on exertion chronically   Cardiovascular:  Negative for chest pain.   Gastrointestinal:  Negative for blood in stool and constipation.   Genitourinary:  Negative for difficulty urinating.   Neurological:  Negative for dizziness and light-headedness.   All other systems reviewed and are negative.      Objective   Vitals:  /78 (BP Location: Right arm, Patient Position: Sitting)   Pulse 108   Ht 1.753 m (5' 9\")   Wt 96.2 kg (212 lb)   SpO2 96%   BMI 31.31 kg/m²       Physical Exam  Constitutional:       Appearance: She is obese.   HENT:      Head: Normocephalic.   Cardiovascular:      Rate and Rhythm: Normal rate and regular rhythm.   Pulmonary:      Effort: Pulmonary effort is normal.      Breath sounds: Normal breath sounds.   Skin:     General: Skin is warm and dry.   Neurological:      Mental Status: She is alert and oriented to person, place, and time.   Psychiatric:         Mood and Affect: Mood normal.         Behavior: Behavior normal.         Assessment & Plan  Routine general medical examination at health care facility    PNEUMONIA vaccine- completed  SHINGLES vaccine- Declined   INFLUENZA vaccine- Completed      Screening tests:  Colon cancer screening--> Due 2026, completed Cologuard in March 2023 and that was negative  Breast Cancer screening--> March 2024, due this year order " placed  Cervical Cancer Screening--> Not indicated  DXA screening--> never done, ordered    During the course of the visit the patient was educated and counseled about age appropriate screening and preventive services. Completed preventive screenings were documented in the chart and orders were placed for outstanding screenings/procedures as documented in the Assessment and Plan.     Orders:    1 Year Follow Up In [facility] - PCP - Wellness Exam; Future    BI mammo bilateral screening tomosynthesis; Future    XR DEXA bone density; Future    Type 2 diabetes mellitus with diabetic mononeuropathy, without long-term current use of insulin  Lab Results   Component Value Date    HGBA1C 7.9 (A) 05/14/2025    HGBA1C 7.9 (A) 12/13/2024    HGBA1C 6.5 (A) 06/16/2023     -A1c unchanged from last year, goal A1c <7  -Will increase Mounjaro from 2.5 mg to 5 mg and follow up in 3 three months for recheck A1c  -Discussed diet and exercise  -Eye exam done in Jan 2025   -Check BG at home % keep a log  Orders:    Follow Up [facility] - PCP - Established    Albumin-Creatinine Ratio, Urine Random; Future    Comprehensive Metabolic Panel; Future    CBC and Auto Differential; Future    tirzepatide (Mounjaro) 5 mg/0.5 mL pen injector; Inject 5 mg under the skin 1 (one) time per week.    Follow Up [facility] - PCP - Established; Future    Type 2 diabetes mellitus without complication, without long-term current use of insulin    Orders:    POCT glycosylated hemoglobin (Hb A1C) manually resulted    Albumin-Creatinine Ratio, Urine Random; Future    tirzepatide (Mounjaro) 5 mg/0.5 mL pen injector; Inject 5 mg under the skin 1 (one) time per week.    Hyperlipidemia, unspecified hyperlipidemia type  -Continue Atorvastatin  -Recheck Lipid panel ordered  Orders:    Lipid panel; Future    Follow Up [facility] - PCP - Established; Future    Dry scalp    Orders:    diflorasone (Psorcon) 0.05 %  cream; Apply 1 Application topically 2 times a day. 1-2 TIMES DAILY AS NEEDED    Obesity (BMI 30-39.9)  -increased Mounjaro to help with weight loss and better control A1c       Dyspnea on exertion  Chronically has dyspnea on exertion  Former smoker  Ordered Albuterol as needed  Orders:    albuterol (Ventolin HFA) 90 mcg/actuation inhaler; Inhale 2 puffs every 4 hours if needed for wheezing or shortness of breath.    Chronic low back pain, unspecified back pain laterality, unspecified whether sciatica present  -Uses lidocaine patch, on Cymbalta, Flexeril       Other chronic pain    Orders:    lidocaine 3.5 % adhesive patch,medicated; Place 1 inch on the skin once daily.    Screening for thyroid disorder    Orders:    TSH with reflex to Free T4 if abnormal; Future    Encounter for screening mammogram for malignant neoplasm of breast    Orders:    BI mammo bilateral screening tomosynthesis; Future    Other osteoporosis without current pathological fracture    Orders:    XR DEXA bone density; Future

## 2025-05-14 NOTE — ASSESSMENT & PLAN NOTE
Chronically has dyspnea on exertion  Former smoker  Ordered Albuterol as needed  Orders:    albuterol (Ventolin HFA) 90 mcg/actuation inhaler; Inhale 2 puffs every 4 hours if needed for wheezing or shortness of breath.

## 2025-05-14 NOTE — PATIENT INSTRUCTIONS
Ordered Mammogram and Bone Density for you please get those done  Check sugars at home and keep a log  Ordered labs, please do them fasting  Will check your A1c in 3 months  I increased your Mounjaro to 5 mg

## 2025-06-21 DIAGNOSIS — I10 ESSENTIAL HYPERTENSION: ICD-10-CM

## 2025-06-23 RX ORDER — AMLODIPINE BESYLATE 5 MG/1
5 TABLET ORAL DAILY
Qty: 90 TABLET | Refills: 0 | Status: SHIPPED | OUTPATIENT
Start: 2025-06-23

## 2025-09-11 ENCOUNTER — APPOINTMENT (OUTPATIENT)
Dept: PRIMARY CARE | Facility: CLINIC | Age: 65
End: 2025-09-11
Payer: COMMERCIAL